# Patient Record
Sex: FEMALE | Race: WHITE | NOT HISPANIC OR LATINO | Employment: OTHER | URBAN - METROPOLITAN AREA
[De-identification: names, ages, dates, MRNs, and addresses within clinical notes are randomized per-mention and may not be internally consistent; named-entity substitution may affect disease eponyms.]

---

## 2017-01-23 ENCOUNTER — HISTORICAL (OUTPATIENT)
Dept: SPEECH THERAPY | Facility: HOSPITAL | Age: 82
End: 2017-01-23

## 2017-02-09 ENCOUNTER — HISTORICAL (OUTPATIENT)
Dept: SPEECH THERAPY | Facility: HOSPITAL | Age: 82
End: 2017-02-09

## 2017-02-21 ENCOUNTER — HISTORICAL (OUTPATIENT)
Dept: SPEECH THERAPY | Facility: HOSPITAL | Age: 82
End: 2017-02-21

## 2017-03-15 ENCOUNTER — HISTORICAL (OUTPATIENT)
Dept: SPEECH THERAPY | Facility: HOSPITAL | Age: 82
End: 2017-03-15

## 2017-05-23 ENCOUNTER — HISTORICAL (OUTPATIENT)
Dept: LAB | Facility: HOSPITAL | Age: 82
End: 2017-05-23

## 2017-05-23 LAB
ABS NEUT (OLG): 3.46 X10(3)/MCL (ref 2.1–9.2)
ALBUMIN SERPL-MCNC: 3.6 GM/DL (ref 3.4–5)
ALBUMIN/GLOB SERPL: 1.1 {RATIO}
ALP SERPL-CCNC: 78 UNIT/L (ref 38–126)
ALT SERPL-CCNC: 22 UNIT/L (ref 12–78)
APTT PPP: 31.9 SECOND(S) (ref 20.6–36)
AST SERPL-CCNC: 10 UNIT/L (ref 15–37)
BASOPHILS # BLD AUTO: 0 X10(3)/MCL (ref 0–0.2)
BASOPHILS NFR BLD AUTO: 1 %
BILIRUB SERPL-MCNC: 0.3 MG/DL (ref 0.2–1)
BILIRUBIN DIRECT+TOT PNL SERPL-MCNC: 0.1 MG/DL (ref 0–0.2)
BILIRUBIN DIRECT+TOT PNL SERPL-MCNC: 0.2 MG/DL (ref 0–0.8)
BUN SERPL-MCNC: 21 MG/DL (ref 7–18)
CALCIUM SERPL-MCNC: 8.8 MG/DL (ref 8.5–10.1)
CHLORIDE SERPL-SCNC: 102 MMOL/L (ref 98–107)
CO2 SERPL-SCNC: 29 MMOL/L (ref 21–32)
CREAT SERPL-MCNC: 0.86 MG/DL (ref 0.55–1.02)
EOSINOPHIL # BLD AUTO: 0.1 X10(3)/MCL (ref 0–0.9)
EOSINOPHIL NFR BLD AUTO: 2 %
ERYTHROCYTE [DISTWIDTH] IN BLOOD BY AUTOMATED COUNT: 14.6 % (ref 11.5–17)
GLOBULIN SER-MCNC: 3.3 GM/DL (ref 2.4–3.5)
GLUCOSE SERPL-MCNC: 135 MG/DL (ref 74–106)
HCT VFR BLD AUTO: 39.2 % (ref 37–47)
HGB BLD-MCNC: 12.6 GM/DL (ref 12–16)
INR PPP: 1.13 (ref 0–1.27)
LYMPHOCYTES # BLD AUTO: 1.4 X10(3)/MCL (ref 0.6–4.6)
LYMPHOCYTES NFR BLD AUTO: 26 %
MCH RBC QN AUTO: 29.2 PG (ref 27–31)
MCHC RBC AUTO-ENTMCNC: 32.1 GM/DL (ref 33–36)
MCV RBC AUTO: 91 FL (ref 80–94)
MONOCYTES # BLD AUTO: 0.4 X10(3)/MCL (ref 0.1–1.3)
MONOCYTES NFR BLD AUTO: 7 %
NEUTROPHILS # BLD AUTO: 3.46 X10(3)/MCL (ref 1.4–7.9)
NEUTROPHILS NFR BLD AUTO: 63 %
PLATELET # BLD AUTO: 227 X10(3)/MCL (ref 130–400)
PMV BLD AUTO: 9.6 FL (ref 9.4–12.4)
POTASSIUM SERPL-SCNC: 4.6 MMOL/L (ref 3.5–5.1)
PROT SERPL-MCNC: 6.9 GM/DL (ref 6.4–8.2)
PROTHROMBIN TIME: 14.3 SECOND(S) (ref 12.1–14.2)
RBC # BLD AUTO: 4.31 X10(6)/MCL (ref 4.2–5.4)
SODIUM SERPL-SCNC: 139 MMOL/L (ref 136–145)
WBC # SPEC AUTO: 5.5 X10(3)/MCL (ref 4.5–11.5)

## 2017-05-26 ENCOUNTER — HISTORICAL (OUTPATIENT)
Dept: ADMINISTRATIVE | Facility: HOSPITAL | Age: 82
End: 2017-05-26

## 2017-06-01 ENCOUNTER — HISTORICAL (OUTPATIENT)
Dept: SPEECH THERAPY | Facility: HOSPITAL | Age: 82
End: 2017-06-01

## 2017-06-21 ENCOUNTER — HISTORICAL (OUTPATIENT)
Dept: SPEECH THERAPY | Facility: HOSPITAL | Age: 82
End: 2017-06-21

## 2017-07-24 ENCOUNTER — HISTORICAL (OUTPATIENT)
Dept: SURGERY | Facility: HOSPITAL | Age: 82
End: 2017-07-24

## 2017-07-24 LAB
ABS NEUT (OLG): 3.8 X10(3)/MCL (ref 1.5–6.9)
ALBUMIN SERPL-MCNC: 3.7 GM/DL (ref 3.4–5)
APTT PPP: 26.3 SECOND(S) (ref 25–35)
BASOPHILS # BLD AUTO: 0 X10(3)/MCL (ref 0–0.1)
BASOPHILS NFR BLD AUTO: 1 % (ref 0–1)
BUN SERPL-MCNC: 16 MG/DL (ref 10–20)
CALCIUM SERPL-MCNC: 8.8 MG/DL (ref 8–10.5)
CHLORIDE SERPL-SCNC: 103 MMOL/L (ref 100–108)
CO2 SERPL-SCNC: 30 MMOL/L (ref 21–35)
CREAT SERPL-MCNC: 0.87 MG/DL (ref 0.7–1.3)
EOSINOPHIL # BLD AUTO: 0.1 X10(3)/MCL (ref 0–0.6)
EOSINOPHIL NFR BLD AUTO: 2 % (ref 0–5)
ERYTHROCYTE [DISTWIDTH] IN BLOOD BY AUTOMATED COUNT: 14.1 % (ref 11.5–17)
GLUCOSE SERPL-MCNC: 99 MG/DL (ref 75–116)
HCT VFR BLD AUTO: 37.3 % (ref 36–48)
HGB BLD-MCNC: 12.4 GM/DL (ref 12–16)
INR PPP: 1.1 (ref 0–1.2)
LYMPHOCYTES # BLD AUTO: 1.1 X10(3)/MCL (ref 0.5–4.1)
LYMPHOCYTES NFR BLD AUTO: 20.6 % (ref 15–40)
MCH RBC QN AUTO: 30 PG (ref 27–34)
MCHC RBC AUTO-ENTMCNC: 33 GM/DL (ref 31–36)
MCV RBC AUTO: 91 FL (ref 80–99)
MONOCYTES # BLD AUTO: 0.4 X10(3)/MCL (ref 0–1.1)
MONOCYTES NFR BLD AUTO: 7 % (ref 4–12)
NEUTROPHILS # BLD AUTO: 3.8 X10(3)/MCL (ref 1.5–6.9)
NEUTROPHILS NFR BLD AUTO: 70 % (ref 43–75)
PHOSPHATE SERPL-MCNC: 4.6 MG/DL (ref 2.6–4.7)
PLATELET # BLD AUTO: 237 X10(3)/MCL (ref 140–400)
PMV BLD AUTO: 9.5 FL (ref 6.8–10)
POTASSIUM SERPL-SCNC: 4.3 MMOL/L (ref 3.6–5.2)
PROTHROMBIN TIME: 11.5 SECOND(S) (ref 9–12)
RBC # BLD AUTO: 4.1 X10(6)/MCL (ref 4.2–5.4)
SODIUM SERPL-SCNC: 139 MMOL/L (ref 135–145)
WBC # SPEC AUTO: 5.4 X10(3)/MCL (ref 4.5–11.5)

## 2017-07-25 ENCOUNTER — HISTORICAL (OUTPATIENT)
Dept: ADMINISTRATIVE | Facility: HOSPITAL | Age: 82
End: 2017-07-25

## 2017-07-25 LAB
ABS NEUT (OLG): 4.92 X10(3)/MCL (ref 2.1–9.2)
ALBUMIN SERPL-MCNC: 3.5 GM/DL (ref 3.4–5)
ALBUMIN/GLOB SERPL: 1.1 RATIO (ref 1.1–2)
ALP SERPL-CCNC: 64 UNIT/L (ref 38–126)
ALT SERPL-CCNC: 23 UNIT/L (ref 12–78)
AST SERPL-CCNC: 12 UNIT/L (ref 15–37)
BASOPHILS # BLD AUTO: 0 X10(3)/MCL (ref 0–0.2)
BASOPHILS NFR BLD AUTO: 0.6 %
BILIRUB SERPL-MCNC: 0.4 MG/DL (ref 0.2–1)
BILIRUBIN DIRECT+TOT PNL SERPL-MCNC: 0.1 MG/DL (ref 0–0.5)
BILIRUBIN DIRECT+TOT PNL SERPL-MCNC: 0.3 MG/DL (ref 0–0.8)
BUN SERPL-MCNC: 17 MG/DL (ref 7–18)
CALCIUM SERPL-MCNC: 8.5 MG/DL (ref 8.5–10.1)
CHLORIDE SERPL-SCNC: 104 MMOL/L (ref 98–107)
CO2 SERPL-SCNC: 27 MMOL/L (ref 21–32)
CREAT SERPL-MCNC: 0.94 MG/DL (ref 0.55–1.02)
EOSINOPHIL # BLD AUTO: 0.1 X10(3)/MCL (ref 0–0.9)
EOSINOPHIL NFR BLD AUTO: 1.6 %
ERYTHROCYTE [DISTWIDTH] IN BLOOD BY AUTOMATED COUNT: 14.1 % (ref 11.5–17)
FERRITIN SERPL-MCNC: 89.9 NG/ML (ref 8–388)
GLOBULIN SER-MCNC: 3.3 GM/DL (ref 2.4–3.5)
GLUCOSE SERPL-MCNC: 91 MG/DL (ref 74–106)
HCT VFR BLD AUTO: 36.4 % (ref 37–47)
HGB BLD-MCNC: 11.8 GM/DL (ref 12–16)
IRON SATN MFR SERPL: 24.7 % (ref 20–50)
IRON SERPL-MCNC: 80 MCG/DL (ref 50–175)
LYMPHOCYTES # BLD AUTO: 1.3 X10(3)/MCL (ref 0.6–4.6)
LYMPHOCYTES NFR BLD AUTO: 19.1 %
MCH RBC QN AUTO: 30.1 PG (ref 27–31)
MCHC RBC AUTO-ENTMCNC: 32.4 GM/DL (ref 33–36)
MCV RBC AUTO: 92.9 FL (ref 80–94)
MONOCYTES # BLD AUTO: 0.5 X10(3)/MCL (ref 0.1–1.3)
MONOCYTES NFR BLD AUTO: 7.1 %
NEUTROPHILS # BLD AUTO: 4.9 X10(3)/MCL (ref 2.1–9.2)
NEUTROPHILS NFR BLD AUTO: 71.6 %
PLATELET # BLD AUTO: 209 X10(3)/MCL (ref 130–400)
PMV BLD AUTO: 8.8 FL (ref 9.4–12.4)
POTASSIUM SERPL-SCNC: 4 MMOL/L (ref 3.5–5.1)
PROT SERPL-MCNC: 6.8 GM/DL (ref 6.4–8.2)
RBC # BLD AUTO: 3.92 X10(6)/MCL (ref 4.2–5.4)
SODIUM SERPL-SCNC: 141 MMOL/L (ref 136–145)
TIBC SERPL-MCNC: 324 MCG/DL (ref 250–450)
TRANSFERRIN SERPL-MCNC: 262 MG/DL (ref 200–360)
VIT B12 SERPL-MCNC: 546 PG/ML (ref 193–986)
WBC # SPEC AUTO: 6.9 X10(3)/MCL (ref 4.5–11.5)

## 2017-07-31 ENCOUNTER — HOSPITAL ENCOUNTER (OUTPATIENT)
Dept: MEDSURG UNIT | Facility: HOSPITAL | Age: 82
End: 2017-08-01

## 2017-08-10 ENCOUNTER — HISTORICAL (OUTPATIENT)
Dept: ANESTHESIOLOGY | Facility: HOSPITAL | Age: 82
End: 2017-08-10

## 2017-08-10 LAB
ABS NEUT (OLG): 3.7 X10(3)/MCL (ref 1.5–6.9)
BASOPHILS # BLD AUTO: 0 X10(3)/MCL (ref 0–0.1)
BASOPHILS NFR BLD AUTO: 1 % (ref 0–1)
BUN SERPL-MCNC: 19 MG/DL (ref 10–20)
CALCIUM SERPL-MCNC: 8.8 MG/DL (ref 8–10.5)
CHLORIDE SERPL-SCNC: 105 MMOL/L (ref 100–108)
CO2 SERPL-SCNC: 30 MMOL/L (ref 21–35)
CREAT SERPL-MCNC: 0.8 MG/DL (ref 0.7–1.3)
EOSINOPHIL # BLD AUTO: 0.1 X10(3)/MCL (ref 0–0.6)
EOSINOPHIL NFR BLD AUTO: 2 % (ref 0–5)
ERYTHROCYTE [DISTWIDTH] IN BLOOD BY AUTOMATED COUNT: 14.1 % (ref 11.5–17)
GLUCOSE SERPL-MCNC: 126 MG/DL (ref 75–116)
HCT VFR BLD AUTO: 37.3 % (ref 36–48)
HGB BLD-MCNC: 12.3 GM/DL (ref 12–16)
LYMPHOCYTES # BLD AUTO: 1.6 X10(3)/MCL (ref 0.5–4.1)
LYMPHOCYTES NFR BLD AUTO: 25.7 % (ref 15–40)
MCH RBC QN AUTO: 30 PG (ref 27–34)
MCHC RBC AUTO-ENTMCNC: 33 GM/DL (ref 31–36)
MCV RBC AUTO: 91 FL (ref 80–99)
MONOCYTES # BLD AUTO: 0.6 X10(3)/MCL (ref 0–1.1)
MONOCYTES NFR BLD AUTO: 9 % (ref 4–12)
NEUTROPHILS # BLD AUTO: 3.7 X10(3)/MCL (ref 1.5–6.9)
NEUTROPHILS NFR BLD AUTO: 62 % (ref 43–75)
PLATELET # BLD AUTO: 261 X10(3)/MCL (ref 140–400)
PMV BLD AUTO: 9.7 FL (ref 6.8–10)
POTASSIUM SERPL-SCNC: 4.2 MMOL/L (ref 3.6–5.2)
RBC # BLD AUTO: 4.11 X10(6)/MCL (ref 4.2–5.4)
SODIUM SERPL-SCNC: 142 MMOL/L (ref 135–145)
WBC # SPEC AUTO: 6.1 X10(3)/MCL (ref 4.5–11.5)

## 2017-09-19 ENCOUNTER — HISTORICAL (OUTPATIENT)
Dept: SPEECH THERAPY | Facility: HOSPITAL | Age: 82
End: 2017-09-19

## 2017-09-21 ENCOUNTER — HISTORICAL (OUTPATIENT)
Dept: SPEECH THERAPY | Facility: HOSPITAL | Age: 82
End: 2017-09-21

## 2018-01-29 ENCOUNTER — HISTORICAL (OUTPATIENT)
Dept: SPEECH THERAPY | Facility: HOSPITAL | Age: 83
End: 2018-01-29

## 2018-04-12 ENCOUNTER — HISTORICAL (OUTPATIENT)
Dept: ADMINISTRATIVE | Facility: HOSPITAL | Age: 83
End: 2018-04-12

## 2018-04-23 ENCOUNTER — HISTORICAL (OUTPATIENT)
Dept: SPEECH THERAPY | Facility: HOSPITAL | Age: 83
End: 2018-04-23

## 2018-07-24 ENCOUNTER — HISTORICAL (OUTPATIENT)
Dept: ADMINISTRATIVE | Facility: HOSPITAL | Age: 83
End: 2018-07-24

## 2018-07-24 LAB
ABS NEUT (OLG): 7.99 X10(3)/MCL (ref 2.1–9.2)
ALBUMIN SERPL-MCNC: 3.6 GM/DL (ref 3.4–5)
ALBUMIN/GLOB SERPL: 1 RATIO (ref 1.1–2)
ALP SERPL-CCNC: 73 UNIT/L (ref 38–126)
ALT SERPL-CCNC: 20 UNIT/L (ref 12–78)
AST SERPL-CCNC: 11 UNIT/L (ref 15–37)
BASOPHILS # BLD AUTO: 0 X10(3)/MCL (ref 0–0.2)
BASOPHILS NFR BLD AUTO: 0.3 %
BILIRUB SERPL-MCNC: 0.4 MG/DL (ref 0.2–1)
BILIRUBIN DIRECT+TOT PNL SERPL-MCNC: 0.1 MG/DL (ref 0–0.5)
BILIRUBIN DIRECT+TOT PNL SERPL-MCNC: 0.3 MG/DL (ref 0–0.8)
BUN SERPL-MCNC: 17 MG/DL (ref 7–18)
CALCIUM SERPL-MCNC: 8.9 MG/DL (ref 8.5–10.1)
CHLORIDE SERPL-SCNC: 103 MMOL/L (ref 98–107)
CO2 SERPL-SCNC: 31 MMOL/L (ref 21–32)
CREAT SERPL-MCNC: 0.8 MG/DL (ref 0.55–1.02)
EOSINOPHIL # BLD AUTO: 0.1 X10(3)/MCL (ref 0–0.9)
EOSINOPHIL NFR BLD AUTO: 1.1 %
ERYTHROCYTE [DISTWIDTH] IN BLOOD BY AUTOMATED COUNT: 13.8 % (ref 11.5–17)
GLOBULIN SER-MCNC: 3.7 GM/DL (ref 2.4–3.5)
GLUCOSE SERPL-MCNC: 112 MG/DL (ref 74–106)
HCT VFR BLD AUTO: 38.5 % (ref 37–47)
HGB BLD-MCNC: 12.4 GM/DL (ref 12–16)
LYMPHOCYTES # BLD AUTO: 1.2 X10(3)/MCL (ref 0.6–4.6)
LYMPHOCYTES NFR BLD AUTO: 11.6 %
MCH RBC QN AUTO: 30 PG (ref 27–31)
MCHC RBC AUTO-ENTMCNC: 32.2 GM/DL (ref 33–36)
MCV RBC AUTO: 93 FL (ref 80–94)
MONOCYTES # BLD AUTO: 0.6 X10(3)/MCL (ref 0.1–1.3)
MONOCYTES NFR BLD AUTO: 6.5 %
NEUTROPHILS # BLD AUTO: 8 X10(3)/MCL (ref 2.1–9.2)
NEUTROPHILS NFR BLD AUTO: 80.5 %
PLATELET # BLD AUTO: 266 X10(3)/MCL (ref 130–400)
PMV BLD AUTO: 9.1 FL (ref 9.4–12.4)
POTASSIUM SERPL-SCNC: 4.9 MMOL/L (ref 3.5–5.1)
PROT SERPL-MCNC: 7.3 GM/DL (ref 6.4–8.2)
RBC # BLD AUTO: 4.14 X10(6)/MCL (ref 4.2–5.4)
SODIUM SERPL-SCNC: 140 MMOL/L (ref 136–145)
WBC # SPEC AUTO: 9.9 X10(3)/MCL (ref 4.5–11.5)

## 2018-08-01 ENCOUNTER — HISTORICAL (OUTPATIENT)
Dept: SPEECH THERAPY | Facility: HOSPITAL | Age: 83
End: 2018-08-01

## 2018-11-28 ENCOUNTER — HISTORICAL (OUTPATIENT)
Dept: SPEECH THERAPY | Facility: HOSPITAL | Age: 83
End: 2018-11-28

## 2019-05-14 ENCOUNTER — HISTORICAL (OUTPATIENT)
Dept: SPEECH THERAPY | Facility: HOSPITAL | Age: 84
End: 2019-05-14

## 2019-07-23 ENCOUNTER — HISTORICAL (OUTPATIENT)
Dept: ADMINISTRATIVE | Facility: HOSPITAL | Age: 84
End: 2019-07-23

## 2019-07-23 LAB
ABS NEUT (OLG): 4.17 X10(3)/MCL (ref 2.1–9.2)
ALBUMIN SERPL-MCNC: 3.6 GM/DL (ref 3.4–5)
ALBUMIN/GLOB SERPL: 1 {RATIO}
ALP SERPL-CCNC: 80 UNIT/L (ref 38–126)
ALT SERPL-CCNC: 21 UNIT/L (ref 12–78)
AST SERPL-CCNC: 9 UNIT/L (ref 15–37)
BASOPHILS # BLD AUTO: 0 X10(3)/MCL (ref 0–0.2)
BASOPHILS NFR BLD AUTO: 0.7 %
BILIRUB SERPL-MCNC: 0.5 MG/DL (ref 0.2–1)
BILIRUBIN DIRECT+TOT PNL SERPL-MCNC: 0.1 MG/DL (ref 0–0.2)
BILIRUBIN DIRECT+TOT PNL SERPL-MCNC: 0.4 MG/DL (ref 0–0.8)
BUN SERPL-MCNC: 17 MG/DL (ref 7–18)
CALCIUM SERPL-MCNC: 8.6 MG/DL (ref 8.5–10.1)
CHLORIDE SERPL-SCNC: 105 MMOL/L (ref 98–107)
CO2 SERPL-SCNC: 29 MMOL/L (ref 21–32)
CREAT SERPL-MCNC: 0.83 MG/DL (ref 0.55–1.02)
EOSINOPHIL # BLD AUTO: 0.1 X10(3)/MCL (ref 0–0.9)
EOSINOPHIL NFR BLD AUTO: 2 %
ERYTHROCYTE [DISTWIDTH] IN BLOOD BY AUTOMATED COUNT: 13.2 % (ref 11.5–17)
GLOBULIN SER-MCNC: 3.6 GM/DL (ref 2.4–3.5)
GLUCOSE SERPL-MCNC: 113 MG/DL (ref 74–106)
HCT VFR BLD AUTO: 41 % (ref 37–47)
HGB BLD-MCNC: 12.9 GM/DL (ref 12–16)
LYMPHOCYTES # BLD AUTO: 1.2 X10(3)/MCL (ref 0.6–4.6)
LYMPHOCYTES NFR BLD AUTO: 20.8 %
MCH RBC QN AUTO: 29.1 PG (ref 27–31)
MCHC RBC AUTO-ENTMCNC: 31.5 GM/DL (ref 33–36)
MCV RBC AUTO: 92.6 FL (ref 80–94)
MONOCYTES # BLD AUTO: 0.4 X10(3)/MCL (ref 0.1–1.3)
MONOCYTES NFR BLD AUTO: 6.8 %
NEUTROPHILS # BLD AUTO: 4.2 X10(3)/MCL (ref 2.1–9.2)
NEUTROPHILS NFR BLD AUTO: 69.5 %
PLATELET # BLD AUTO: 240 X10(3)/MCL (ref 130–400)
PMV BLD AUTO: 9 FL (ref 9.4–12.4)
POTASSIUM SERPL-SCNC: 4.3 MMOL/L (ref 3.5–5.1)
PROT SERPL-MCNC: 7.2 GM/DL (ref 6.4–8.2)
RBC # BLD AUTO: 4.43 X10(6)/MCL (ref 4.2–5.4)
SODIUM SERPL-SCNC: 141 MMOL/L (ref 136–145)
WBC # SPEC AUTO: 6 X10(3)/MCL (ref 4.5–11.5)

## 2019-08-16 ENCOUNTER — HISTORICAL (OUTPATIENT)
Dept: SPEECH THERAPY | Facility: HOSPITAL | Age: 84
End: 2019-08-16

## 2019-12-11 ENCOUNTER — HISTORICAL (OUTPATIENT)
Dept: SPEECH THERAPY | Facility: HOSPITAL | Age: 84
End: 2019-12-11

## 2020-06-11 ENCOUNTER — HISTORICAL (OUTPATIENT)
Dept: SPEECH THERAPY | Facility: HOSPITAL | Age: 85
End: 2020-06-11

## 2020-07-23 ENCOUNTER — HISTORICAL (OUTPATIENT)
Dept: HEMATOLOGY/ONCOLOGY | Facility: CLINIC | Age: 85
End: 2020-07-23

## 2020-07-23 LAB
ABS NEUT (OLG): 3.89 X10(3)/MCL (ref 2.1–9.2)
ALBUMIN SERPL-MCNC: 3.6 GM/DL (ref 3.4–5)
ALBUMIN/GLOB SERPL: 1.2 RATIO (ref 1.1–2)
ALP SERPL-CCNC: 63 UNIT/L (ref 40–150)
ALT SERPL-CCNC: 11 UNIT/L (ref 0–55)
AST SERPL-CCNC: 12 UNIT/L (ref 5–34)
BASOPHILS # BLD AUTO: 0 X10(3)/MCL (ref 0–0.2)
BASOPHILS NFR BLD AUTO: 0.9 %
BILIRUB SERPL-MCNC: 0.5 MG/DL
BILIRUBIN DIRECT+TOT PNL SERPL-MCNC: 0.2 MG/DL (ref 0–0.5)
BILIRUBIN DIRECT+TOT PNL SERPL-MCNC: 0.3 MG/DL (ref 0–0.8)
BUN SERPL-MCNC: 16.4 MG/DL (ref 9.8–20.1)
CALCIUM SERPL-MCNC: 8.8 MG/DL (ref 8.4–10.2)
CHLORIDE SERPL-SCNC: 101 MMOL/L (ref 98–107)
CO2 SERPL-SCNC: 31 MMOL/L (ref 23–31)
CREAT SERPL-MCNC: 0.85 MG/DL (ref 0.55–1.02)
EOSINOPHIL # BLD AUTO: 0.1 X10(3)/MCL (ref 0–0.9)
EOSINOPHIL NFR BLD AUTO: 1.6 %
ERYTHROCYTE [DISTWIDTH] IN BLOOD BY AUTOMATED COUNT: 13.4 % (ref 11.5–17)
GLOBULIN SER-MCNC: 3 GM/DL (ref 2.4–3.5)
GLUCOSE SERPL-MCNC: 107 MG/DL (ref 82–115)
HCT VFR BLD AUTO: 39.1 % (ref 37–47)
HGB BLD-MCNC: 12.2 GM/DL (ref 12–16)
LYMPHOCYTES # BLD AUTO: 1.1 X10(3)/MCL (ref 0.6–4.6)
LYMPHOCYTES NFR BLD AUTO: 19.9 %
MCH RBC QN AUTO: 28.6 PG (ref 27–31)
MCHC RBC AUTO-ENTMCNC: 31.2 GM/DL (ref 33–36)
MCV RBC AUTO: 91.8 FL (ref 80–94)
MONOCYTES # BLD AUTO: 0.4 X10(3)/MCL (ref 0.1–1.3)
MONOCYTES NFR BLD AUTO: 7.1 %
NEUTROPHILS # BLD AUTO: 3.9 X10(3)/MCL (ref 2.1–9.2)
NEUTROPHILS NFR BLD AUTO: 70.3 %
PLATELET # BLD AUTO: 207 X10(3)/MCL (ref 130–400)
PMV BLD AUTO: 8.5 FL (ref 9.4–12.4)
POTASSIUM SERPL-SCNC: 4.1 MMOL/L (ref 3.5–5.1)
PROT SERPL-MCNC: 6.6 GM/DL (ref 5.8–7.6)
RBC # BLD AUTO: 4.26 X10(6)/MCL (ref 4.2–5.4)
SODIUM SERPL-SCNC: 138 MMOL/L (ref 136–145)
WBC # SPEC AUTO: 5.5 X10(3)/MCL (ref 4.5–11.5)

## 2020-09-01 ENCOUNTER — HISTORICAL (OUTPATIENT)
Dept: ADMINISTRATIVE | Facility: HOSPITAL | Age: 85
End: 2020-09-01

## 2020-09-01 LAB
FERRITIN SERPL-MCNC: 42.72 NG/ML (ref 4.63–204)
VIT B12 SERPL-MCNC: 523 PG/ML (ref 213–816)

## 2020-09-04 ENCOUNTER — HISTORICAL (OUTPATIENT)
Dept: SPEECH THERAPY | Facility: HOSPITAL | Age: 85
End: 2020-09-04

## 2020-10-21 ENCOUNTER — HISTORICAL (OUTPATIENT)
Dept: SPEECH THERAPY | Facility: HOSPITAL | Age: 85
End: 2020-10-21

## 2020-11-18 ENCOUNTER — HISTORICAL (OUTPATIENT)
Dept: ADMINISTRATIVE | Facility: HOSPITAL | Age: 85
End: 2020-11-18

## 2020-11-18 LAB
ABS NEUT (OLG): 3.31 X10(3)/MCL (ref 2.1–9.2)
ALBUMIN SERPL-MCNC: 3.8 GM/DL (ref 3.4–4.8)
ALBUMIN/GLOB SERPL: 1.3 RATIO (ref 1.1–2)
ALP SERPL-CCNC: 69 UNIT/L (ref 40–150)
ALT SERPL-CCNC: 14 UNIT/L (ref 0–55)
AST SERPL-CCNC: 15 UNIT/L (ref 5–34)
BASOPHILS # BLD AUTO: 0 X10(3)/MCL (ref 0–0.2)
BASOPHILS NFR BLD AUTO: 0.6 %
BILIRUB SERPL-MCNC: 0.4 MG/DL
BILIRUBIN DIRECT+TOT PNL SERPL-MCNC: 0.2 MG/DL (ref 0–0.5)
BILIRUBIN DIRECT+TOT PNL SERPL-MCNC: 0.2 MG/DL (ref 0–0.8)
BUN SERPL-MCNC: 17.6 MG/DL (ref 9.8–20.1)
CALCIUM SERPL-MCNC: 8.6 MG/DL (ref 8.4–10.2)
CHLORIDE SERPL-SCNC: 104 MMOL/L (ref 98–107)
CO2 SERPL-SCNC: 29 MMOL/L (ref 23–31)
CREAT SERPL-MCNC: 0.74 MG/DL (ref 0.55–1.02)
EOSINOPHIL # BLD AUTO: 0.1 X10(3)/MCL (ref 0–0.9)
EOSINOPHIL NFR BLD AUTO: 1.4 %
ERYTHROCYTE [DISTWIDTH] IN BLOOD BY AUTOMATED COUNT: 13.8 % (ref 11.5–17)
GLOBULIN SER-MCNC: 2.9 GM/DL (ref 2.4–3.5)
GLUCOSE SERPL-MCNC: 60 MG/DL (ref 82–115)
HCT VFR BLD AUTO: 36.7 % (ref 37–47)
HGB BLD-MCNC: 11.8 GM/DL (ref 12–16)
LYMPHOCYTES # BLD AUTO: 1.1 X10(3)/MCL (ref 0.6–4.6)
LYMPHOCYTES NFR BLD AUTO: 22.2 %
MCH RBC QN AUTO: 28.7 PG (ref 27–31)
MCHC RBC AUTO-ENTMCNC: 32.2 GM/DL (ref 33–36)
MCV RBC AUTO: 89.3 FL (ref 80–94)
MONOCYTES # BLD AUTO: 0.4 X10(3)/MCL (ref 0.1–1.3)
MONOCYTES NFR BLD AUTO: 8.7 %
NEUTROPHILS # BLD AUTO: 3.3 X10(3)/MCL (ref 2.1–9.2)
NEUTROPHILS NFR BLD AUTO: 66.9 %
PLATELET # BLD AUTO: 222 X10(3)/MCL (ref 130–400)
PMV BLD AUTO: 8.6 FL (ref 9.4–12.4)
POTASSIUM SERPL-SCNC: 4.1 MMOL/L (ref 3.5–5.1)
PROT SERPL-MCNC: 6.7 GM/DL (ref 5.8–7.6)
RBC # BLD AUTO: 4.11 X10(6)/MCL (ref 4.2–5.4)
SODIUM SERPL-SCNC: 140 MMOL/L (ref 136–145)
WBC # SPEC AUTO: 5 X10(3)/MCL (ref 4.5–11.5)

## 2021-01-08 ENCOUNTER — HISTORICAL (OUTPATIENT)
Dept: SPEECH THERAPY | Facility: HOSPITAL | Age: 86
End: 2021-01-08

## 2021-04-12 ENCOUNTER — HISTORICAL (OUTPATIENT)
Dept: SPEECH THERAPY | Facility: HOSPITAL | Age: 86
End: 2021-04-12

## 2021-09-27 ENCOUNTER — HISTORICAL (OUTPATIENT)
Dept: SPEECH THERAPY | Facility: HOSPITAL | Age: 86
End: 2021-09-27

## 2021-11-22 ENCOUNTER — HISTORICAL (OUTPATIENT)
Dept: SPEECH THERAPY | Facility: HOSPITAL | Age: 86
End: 2021-11-22

## 2021-12-13 ENCOUNTER — HISTORICAL (OUTPATIENT)
Dept: HEMATOLOGY/ONCOLOGY | Facility: CLINIC | Age: 86
End: 2021-12-13

## 2021-12-13 LAB
ABS NEUT (OLG): 4.83 X10(3)/MCL (ref 2.1–9.2)
ALBUMIN SERPL-MCNC: 3.9 GM/DL (ref 3.4–4.8)
ALBUMIN/GLOB SERPL: 1.3 RATIO (ref 1.1–2)
ALP SERPL-CCNC: 67 UNIT/L (ref 40–150)
ALT SERPL-CCNC: 19 UNIT/L (ref 0–55)
AST SERPL-CCNC: 19 UNIT/L (ref 5–34)
BASOPHILS # BLD AUTO: 0 X10(3)/MCL (ref 0–0.2)
BASOPHILS NFR BLD AUTO: 0.7 %
BILIRUB SERPL-MCNC: 0.4 MG/DL
BILIRUBIN DIRECT+TOT PNL SERPL-MCNC: 0.2 MG/DL (ref 0–0.5)
BILIRUBIN DIRECT+TOT PNL SERPL-MCNC: 0.2 MG/DL (ref 0–0.8)
BUN SERPL-MCNC: 22.3 MG/DL (ref 9.8–20.1)
CALCIUM SERPL-MCNC: 8.9 MG/DL (ref 8.7–10.5)
CHLORIDE SERPL-SCNC: 106 MMOL/L (ref 98–107)
CO2 SERPL-SCNC: 30 MMOL/L (ref 23–31)
CREAT SERPL-MCNC: 0.81 MG/DL (ref 0.55–1.02)
EOSINOPHIL # BLD AUTO: 0.1 X10(3)/MCL (ref 0–0.9)
EOSINOPHIL NFR BLD AUTO: 1.5 %
ERYTHROCYTE [DISTWIDTH] IN BLOOD BY AUTOMATED COUNT: 14.4 % (ref 11.5–17)
GLOBULIN SER-MCNC: 3.1 GM/DL (ref 2.4–3.5)
GLUCOSE SERPL-MCNC: 80 MG/DL (ref 82–115)
HCT VFR BLD AUTO: 37.3 % (ref 37–47)
HGB BLD-MCNC: 12 GM/DL (ref 12–16)
LYMPHOCYTES # BLD AUTO: 1.2 X10(3)/MCL (ref 0.6–4.6)
LYMPHOCYTES NFR BLD AUTO: 18.3 %
MCH RBC QN AUTO: 28.8 PG (ref 27–31)
MCHC RBC AUTO-ENTMCNC: 32.2 GM/DL (ref 33–36)
MCV RBC AUTO: 89.7 FL (ref 80–94)
MONOCYTES # BLD AUTO: 0.6 X10(3)/MCL (ref 0.1–1.3)
MONOCYTES NFR BLD AUTO: 8.6 %
NEUTROPHILS # BLD AUTO: 4.8 X10(3)/MCL (ref 2.1–9.2)
NEUTROPHILS NFR BLD AUTO: 70.8 %
PLATELET # BLD AUTO: 231 X10(3)/MCL (ref 130–400)
PMV BLD AUTO: 9 FL (ref 9.4–12.4)
POTASSIUM SERPL-SCNC: 4.7 MMOL/L (ref 3.5–5.1)
PROT SERPL-MCNC: 7 GM/DL (ref 5.8–7.6)
RBC # BLD AUTO: 4.16 X10(6)/MCL (ref 4.2–5.4)
SODIUM SERPL-SCNC: 144 MMOL/L (ref 136–145)
WBC # SPEC AUTO: 6.8 X10(3)/MCL (ref 4.5–11.5)

## 2022-01-19 ENCOUNTER — HISTORICAL (OUTPATIENT)
Dept: SPEECH THERAPY | Facility: HOSPITAL | Age: 87
End: 2022-01-19

## 2022-03-10 ENCOUNTER — HISTORICAL (OUTPATIENT)
Dept: SPEECH THERAPY | Facility: HOSPITAL | Age: 87
End: 2022-03-10

## 2022-04-10 ENCOUNTER — HISTORICAL (OUTPATIENT)
Dept: ADMINISTRATIVE | Facility: HOSPITAL | Age: 87
End: 2022-04-10
Payer: MEDICARE

## 2022-04-13 ENCOUNTER — HISTORICAL (OUTPATIENT)
Dept: SPEECH THERAPY | Facility: HOSPITAL | Age: 87
End: 2022-04-13
Payer: MEDICARE

## 2022-04-29 VITALS
WEIGHT: 121.06 LBS | DIASTOLIC BLOOD PRESSURE: 85 MMHG | HEIGHT: 68 IN | SYSTOLIC BLOOD PRESSURE: 120 MMHG | BODY MASS INDEX: 18.35 KG/M2

## 2022-04-30 NOTE — OP NOTE
PREOPERATIVE DIAGNOSIS:  Right eye dacryocystitis.    POSTOPERATIVE DIAGNOSIS:  Right eye dacryocystitis.    PROCEDURE:  Right eye dacryocystorhinostomy.    ANESTHESIA:  General.    DESCRIPTION OF PROCEDURE:  After premedication, the patient was taken to the operating room, placed in supine position, prepped and draped in a sterile manner.  The nostril was packed with cocaine soaked pledgets.  Local anesthetic was applied for vasoconstriction.  A curvilinear incision was made approximately 3/4 of an inch at the medial canthal area and carried down to the periosteum which was incised with a [place] elevator and mobilized.  A rhinotomy was initiated at the bottom of the fossa and enlarged.  The nasal mucosa was identified and anterior flap created.  The posterior flap was cauterized.  The lacrimal sac was then opened with an 11 blade and a posterior flap was cauterized.  Silicon tubes were placed through the rhinotomy to exit the nostril and were tied with 2-0 silk sutures.  The anterior flaps of the rhinotomy were closed with an interrupted Vicryl and the nostril was packed with a lubricated nasal packing.  The incision was closed with a 6-9running nylon suture.  A rubber band drain was placed at the base.  The patient tolerated the procedure well, was extubated in the OR, taken to recovery in good condition.        THC/MASON   DD: 08/09/2017 0855   DT: 08/09/2017 0917  Job # [id number]

## 2022-04-30 NOTE — H&P
Patient:   Raymundo Arechgia            MRN: [id number]            FIN: [id number]               Age:   81 years     Sex:  Female     :  1935   Associated Diagnoses:   None   Author:   Sarah SANDOVAL, Morgan DUONG      Health Status   The H&P was reviewed, the patient was examined, and there are no changes to the patient's condition..

## 2022-04-30 NOTE — H&P
Patient:   Raymundo Arechiga            MRN: [id number]            FIN: [id number]               Age:   81 years     Sex:  Female     :  1935   Associated Diagnoses:   None   Author:   Morgan Smith MD      Patient: Raymundo Arechiga  YOB: 1935  Age: 81y  Chart: [id number]  Gender: Female  Visit Date: 2017  Referring Physician: Tarik Dexter III, Dr.    Chief Complaint: Dacryocystitis    Historian: patient who is a good historian.     Present Illness:   81-year-old white female who is status post a right dacryocystorhinostomy on 2017 for dacryocystitis of the right eye.  Yesterday evening the patient had accidentally pulled on the dacryocystorhinostomy tubing and it was hanging out of the patient's eye.  Patient was seen by Dr. Dexter earlier today but he could not retrieve the tubing intranasally using a nasal speculum.  He is being sent here for retrieval with use of nasal endoscopy.  Patient does not have any significant pain.  She does not have any purulent rhinorrhea.  No fever.    Past Medical History:  Diabetes mellitus.   Hypercholesterolemia.   Hypothyroidism.  Papillary thyroid carcinoma (stage IV) for which she had undergone total thyroidectomy with total laryngectomy at [facility] in , after having persistent cancer following postoperative radioactive iodine therapy after undergoing a right thyroid lobectomy and isthmusectomy in .  Left breast cancer (stage IA) which she underwent a left lumpectomy on 2011 with postoperative radiation therapy and treatment with Aromasin.  History of pneumothorax.  History of pulmonary nodule in the left lower lobe.    Past Surgical History:   Papillary thyroid carcinoma (stage IV) for which she had undergone total thyroidectomy with total laryngectomy at [facility] in , after having persistent cancer following postoperative radioactive iodine therapy after undergoing a right thyroid lobectomy  and isthmusectomy in 2015.  Left breast cancer (stage IA) which she underwent a left lumpectomy on 11/21/2011 with postoperative radiation therapy and treatment with Aromasin.  Tracheoesophageal puncture on 05/26/2017, with subsequent placement of a tracheoesophageal prosthesis for a laryngeal speech.  Right dacryocystorhinostomy on 07/31/2017.    Current Medications:    Active Medications   Synthroid 100 mcg. Take 1 tablet(s) By Mouth Daily. Do not substitute. (null).   Aromasin 25mg. Take 1 tablet(s) By Mouth Daily. Do not substitute. (null).   Lyrica 50 mg  . Take 1 tablet(s) By Mouth BID. Do not substitute. (null).   Tolterodine  . Take 1 capsule(s) By Mouth Daily. Do not substitute. (null).   Pilocarpine Hydrochloride 5 mg  . Take 1  5mg tablet(s) By Mouth TID. Do not substitute. (null).   allegra D  . Take 1 tablet(s) By Mouth Daily. Do not substitute. (null).   Nasonex nasal spray  .    Tobradex opthalmic suspension 0.1%/0.3%  .    Caltrate 600 With D  . Take 1 tablet(s) By Mouth BID. Do not substitute. (null).   centrum silver  . Take 1 tablet(s) By Mouth Daily. Do not substitute. (null).   Dexilant  . Take 1  60mg tablet(s) By Mouth Daily. Do not substitute. (null).   OTC Pepcid  .       Allergies:   No Active Allergies    Review of Systems:  Review of systems is unremarkable except as mentioned above.     Social History:  Patient has never used tobacco. Patient denies alcohol use. Patient denies the use of illicit drugs. Patient is retired. Patient is . Patient is a retired teacher.    Family History:  There is no family history of bleeding diathesis.   There is no family history of anesthetic complications.   There is a family history of heart disease.   There is a family history of hypertension.   There is a family history of diabetes.       Vitals:   Added vitals entry: Measurement date: 2017-08-09 14:07  Weight: 130.0 lbs  Temperature: 96.8° F  Heart rate: 69 bpm  Blood pressure:  129 / 77 mmHg      Physical Exam:  General: Well developed & well nourished female in no acute distress. Patient speaks using tracheoesophageal prosthesis.  Head & Face: Normocephalic without any apparent skin cancer, facial swelling, masses, or erythema.   Nose: The nose was examined endoscopically after spraying the nose with 1% phenylephrine and 4% lidocaine.  Nasal dorsum is unremarkable. No significant septal deviation. No septal perforation. Patients right nasal cavity was examined endoscopically with a 0° and 30° 2.7 mm sinus endoscope.  The surgical defect related to the dacryocystorhinostomy were noted in the lateral nasal wall just above the inferior turbinate.  There was some granulation in this area.  The dacryocystorhinostomy tubing cannot be visualized and cannot be extracted at this time.  Multiple attempts were made.  Eyes: Right eyedacryocystorhinostomy tubing is present in the canaliculus of the upper and lower eyelids and is hanging out of the eye out of its normal position.  Patient has a well-healed wound in the medial canthal region.  No sign of infection.  Neurologic: Alert & oriented. Cranial nerves 2- 12 are grossly normal.     Assessment:  Right dacryocystitis status post right dacryocystorhinostomy with displaced dacryocystorhinostomy tubing.    Plan:  Risks & alternatives to the proposed procedure were discussed as per consent. Questions were answered.  Patient will be admitted to the hospital on 08/09/2017, for nasal endoscopy and retrieval of dacryocystorhinostomy tubing under general anesthetic.

## 2022-04-30 NOTE — OP NOTE
DATE OF SURGERY:        SURGEON:  Patrick Hart Jr., MD    PREOPERATIVE DIAGNOSIS:  Aphonia, status post total laryngectomy.    POSTOPERATIVE DIAGNOSIS:  Aphonia, status post total laryngectomy.    INDICATION:  Raymundo Arechiga is a pleasant 81-year-old woman who has had a total laryngectomy elsewhere for invasive papillary thyroid carcinoma.  She has been ready for speech rehabilitation with a tracheoesophageal puncture.  After all risks and benefits were discussed, consent was signed and we proceeded today for placement of the TEP.    PROCEDURE:  Tracheoesophageal puncture.    ANESTHESIA:  General.    COMPLICATIONS:  None.    BLOOD LOSS:  Negligible.    PROCEDURE IN DETAIL:  The patient was brought to the operating room, she was identified by name and clinic number, she was transferred to the operating room table in supine position.  General anesthesia was induced and she was intubated via her tracheal stoma at the neck.  After she was asleep, the bed was then turned 90 degrees in preparation for the procedure.  The patient was prepped and draped in the usual fashion for surgery.       A mouth guard was fashioned to protect the upper teeth, a rigid esophagoscope was used to traverse the neopharynx down into the proximal esophagus just behind the party wall.  A rigid endoscope with a camera was also used for better visualization for where the puncture would take place.  Once the light source and distal tip of the rigid esophagoscope was palpated in the tracheal stoma.  The Algoregoox system was used for the TEP.  The curved needle cannula was placed about 10 millimeter from the tracheal stoma opening on the posterior wall of the trachea.  The curved needle went into the rigid esophagoscope in the esophagus and then the blue guide wire was treaded through up and out of the esophagoscope.  At this point, the esophagoscope was removed as well as the curved needle and the blue wire was the only thing remaining in situ.   At this point, the blunt guided cannula to deliver the TEP itself was threaded with the blue wire and this was then pulled down through the neopharnyx out of the trachea esophageal puncture until the tracheal phalange of the prosthesis unfurled through it.  The connecting wire was then cut and the tracheal puncture was in perfect position.  It was arranged vertically to seat well within the trachea and this concluded our procedure.  The mouth guard     was removed.  She tolerated it well.  She was turned back over the anesthesia team to wake up, she woke up with complication and returned to recovery room in stable condition.        ______________________________  MD SAM Dutton Jr./ALYSA  DD:  05/26/2017  Time:  08:37AM  DT:  05/26/2017  Time:  01:07PM  Job #:  [id number]

## 2022-04-30 NOTE — OP NOTE
Patient:   Raymundo Arechiga            MRN: [id number]            FIN: [id number]               Age:   81 years     Sex:  Female     :  1935   Associated Diagnoses:   None   Author:   Morgan Smith MD      Date of procedure: August 10, 2017    Preoperative diagnosis: Dacryocystitis status post right dacryocystorhinostomy with displaced dacryocystorhinostomy tube.    Postoperative diagnosis: Same    Procedure: Nasal endoscopy with retrieval of right dacryocystorhinostomy tube.    Surgeon: Morgan Smith M.D.    Findings: The patient's previously placed right dacryocystorhinostomy to was retracted up into the region of the dacryocystorhinostomy intranasally.  The end of the dacryocystorhinostomy tube was found in this area.    Specimens: None    Estimated blood loss: None    Complications: None    Drains: None    Anesthesia: Gen. endotracheal anesthesia    Indications: Please see history and physical exam    Procedure: Patient was brought to the operating room and placed on the operating room table in supine position after which general endotracheal anesthesia was induced.  Following induction of anesthesia the right nasal septum and right lateral nasal wall were infiltrated with 2% Xylocaine with 1 100,000 epinephrine for hemostasis.  Surgical davy saturated with 4% cocaine within also placed in the right nasal cavity.  The patient was then prepped and draped in sterile fashion.  Examination of the patient showed that the dacryocystorhinostomy tube which had been placed previously was out of position and hanging in the eye.  0° 4 mm sinus endoscope was used for the procedure.  The surgical patties were removed from the right nasal cavity.  The endoscope was inserted and advanced up to the area with dacryocystorhinostomy had been performed.  The ends of the dacryocystorhinostomy tube could then be seen in this area in and around the granulation tissue.  Alligator forceps were used to grasp both ends  of the dacryocystorhinostomy tube and it was pulled down into the nose to secure it back into its the 2 ends of the dacryocystorhinostomy tube were then secured to the right lateral nasal wall with a 4-0 nylon suture securing the tubes just anterior to the inferior turbinate.  At this point procedure was terminated.  The patient was awoken and brought to the recovery room in stable condition.  She tolerated the procedure well.    CC: Dr. Giuseppe Dexter

## 2022-05-04 NOTE — HISTORICAL [facility] CERNER
This is a historical note converted from [place]. Formatting and pictures may have been removed.  Please reference [place] for original formatting and attached multimedia. Chief Complaint  PATIENT HERE FOR RIGHT KNEE PAIN. PATIENT STATES THE PAIN HAS BEEN THERE FOR ABOUT A WEEK AND ON THE LATERAL SIDE OF THE KNEE  History of Present Illness  Patient stable for her right knee pain.  Had an injection which gave her?very good relief for several months even close to a years time.  States the pain is been giving?her increasing problems lately wishes to repeat the injection today  Review of Systems  Constitutional: No fever, No chills.  Respiratory: No shortness of breath, No cough.  Cardiovascular: No chest pain.  Gastrointestinal: No nausea, No vomiting, No diarrhea, No constipation, No heartburn.  Genitourinary: No dysuria, No hematuria.  Hematology/Lymphatics: No bleeding tendency.  Endocrine: No polyuria.  Neurologic: Alert and oriented X4, No numbness, No tingling.  Psychiatric: No anxiety, No depression.  Physical Exam  Vitals & Measurements  BP:?114/68?  HT:?170?cm? HT:?170?cm? WT:?58.9?kg? WT:?58.9?kg? BMI:?20.38?  Right knee exam  Tenderness to palpation of the lateral joint line  No effusion to the knee  Crepitance with ROM  ROM 5-125  Good pulses to the feet  No pain with ROM of the hips  ???  X-rays:?Reviewed from previous appointment  Degenerative changes of the knee with narrowing of the lateral compartment(s)?not bone-on-bone  Extra-articular osteophytes  Varus alignment  Assessment: Right knee osteoarthritis  ?   Plan: Right knee cortisone injection 2 cc Celestone 2 cc lidocaine  Using sterile technique after informed verbal consent. Risks discussed with patient prior to injection. Informed him the following instruction to follow:  If you are not already on anti-inflammatory do the following 3 to 4 tablets of Ibuprofen every 6 to 8 hours or 2 Aleve every 12 hours. If unable to take anti-inflammatory take 2  extra strength Tylenol every 8 hours for the next 5 days.  Apply ice to the knee 3 to 4 times a day for the next 5 days.  Make sure and move your knee this is the best way to get the medication where it should be, riding a bike or a stationary bike is also helpful.  Patient voiced understanding.  Assessment/Plan  1.?Osteoarthritis of right knee  Right knee pain   Problem List/Past Medical History  Ongoing  DVT - Deep vein thrombosis  Hyperlipidemia  Mitral valve prolapse  Osteoarthritis of right knee  Review of care plan  Historical  Breast cancer  Thyroid cancer  Procedure/Surgical History  Nasal Endoscopy (Right) (08/10/2017), Repair Right Lacrimal Duct, Via Natural or Artificial Opening Endoscopic (08/10/2017), Unlisted procedure, lacrimal system (08/10/2017), Bypass Right Lacrimal Duct to Nasal Cavity, Open Approach (07/31/2017), Dacryocystorhinostomy (fistulization of lacrimal sac to nasal cavity) (07/31/2017), Dacryocystorhinostomy (Right) (07/31/2017), Construction of tracheoesophageal fistula and subsequent insertion of an alaryngeal speech prosthesis (eg, voice button, Duane-Mendoza prosthesis) (05/26/2017), Drainage of Larynx, Percutaneous Approach (05/26/2017), Tracheoesophageal Puncture (.) (05/26/2017), Laryngectomy/Tracheostomy (11/08/2016), Thyroid surgery (07/01/2015), Lumpectomy-L breast (10/31/2011), Hysterectomy, Tonsillectomy and adenoidectomy; age 13 or over.  Medications  Allegra-D 12 Hour oral tablet, extended release,? ?Not taking  calcium carbonate 600 mg oral tablet, 1200 mg= 2 tab(s), Oral, Daily  DEXILANT CAP 60MG DR, Oral  exemestane 25 mg oral tablet, Oral, Daily  famotidine 40 mg/5 mL oral liquid, 20 mg= 2.5 mL, Oral, Daily  Fish Oil oral capsule, 1 cap(s), Oral, Daily  Inderal  mg oral capsule, extended release  levothyroxine 100 mcg (0.1 mg) oral tablet, 100 mcg= 1 tab(s), Oral, Daily  loratadine 10 mg oral capsule, Oral, Daily  Lyrica 100 mg oral capsule, 0.5 tab(s), Oral,  Daily  Nasonex 50 mcg/inh nasal spray, 2 spray(s), Nasal, Daily  Ofirmev, 1000 mg= 100 mL, IV Piggyback, Once  PILOCARPINE TAB 5MG, 5 mg= 1 tab(s), Oral, TID  TOLTERODINE CAP 4MG ER, 4 mg= 1 cap(s), Oral, Daily,? ?Not taking  Toviaz 4 mg oral tablet, extended release, 4 mg= 1 tab(s), Oral, Daily,? ?Not taking  Allergies  No Known Allergies  Social History  Alcohol - Denies Alcohol Use, 03/19/2014  Home/Environment  Lives with Spouse., 08/10/2017  Living situation: Home/Independent., 07/24/2017  Substance Abuse - Denies Substance Abuse, 03/19/2014  Never, 07/24/2017  Tobacco - Denies Tobacco Use, 03/19/2014  Never smoker, 07/06/2016  Family History  Acute myocardial infarction.: Father and Brother.  Hypertension.: Father.  Primary malignant neoplasm of breast: Mother.

## 2022-06-02 ENCOUNTER — TELEPHONE (OUTPATIENT)
Dept: HEMATOLOGY/ONCOLOGY | Facility: CLINIC | Age: 87
End: 2022-06-02
Payer: MEDICARE

## 2022-06-27 RX ORDER — ALPRAZOLAM 0.25 MG/1
.125-.25 TABLET ORAL 2 TIMES DAILY PRN
COMMUNITY
Start: 2022-03-11

## 2022-06-27 RX ORDER — PREGABALIN 50 MG/1
CAPSULE ORAL
COMMUNITY
Start: 2022-01-18 | End: 2022-07-05 | Stop reason: SDUPTHER

## 2022-06-27 RX ORDER — METOPROLOL SUCCINATE 50 MG/1
75 TABLET, EXTENDED RELEASE ORAL DAILY
COMMUNITY
Start: 2022-03-06

## 2022-06-27 RX ORDER — FAMOTIDINE 40 MG/5ML
20 POWDER, FOR SUSPENSION ORAL ONCE AS NEEDED
COMMUNITY
Start: 2022-05-24

## 2022-06-27 RX ORDER — PILOCARPINE HYDROCHLORIDE 5 MG/1
5 TABLET, FILM COATED ORAL 2 TIMES DAILY
COMMUNITY
Start: 2022-03-15

## 2022-06-27 RX ORDER — LEVOTHYROXINE SODIUM 112 UG/1
112 TABLET ORAL
COMMUNITY
Start: 2022-04-16 | End: 2024-01-25

## 2022-06-27 RX ORDER — SUCRALFATE 1 G/1
1 TABLET ORAL 4 TIMES DAILY
COMMUNITY
Start: 2022-04-25

## 2022-07-05 ENCOUNTER — OFFICE VISIT (OUTPATIENT)
Dept: NEUROLOGY | Facility: CLINIC | Age: 87
End: 2022-07-05
Payer: MEDICARE

## 2022-07-05 VITALS
BODY MASS INDEX: 17.94 KG/M2 | SYSTOLIC BLOOD PRESSURE: 110 MMHG | WEIGHT: 118.38 LBS | DIASTOLIC BLOOD PRESSURE: 60 MMHG | HEART RATE: 68 BPM | HEIGHT: 68 IN

## 2022-07-05 DIAGNOSIS — D32.9 MENINGIOMA: ICD-10-CM

## 2022-07-05 DIAGNOSIS — G62.9 NEUROPATHY: Primary | ICD-10-CM

## 2022-07-05 PROCEDURE — 99999 PR PBB SHADOW E&M-EST. PATIENT-LVL IV: ICD-10-PCS | Mod: PBBFAC,,, | Performed by: PSYCHIATRY & NEUROLOGY

## 2022-07-05 PROCEDURE — 99213 OFFICE O/P EST LOW 20 MIN: CPT | Mod: S$PBB,,, | Performed by: PSYCHIATRY & NEUROLOGY

## 2022-07-05 PROCEDURE — 99213 PR OFFICE/OUTPT VISIT, EST, LEVL III, 20-29 MIN: ICD-10-PCS | Mod: S$PBB,,, | Performed by: PSYCHIATRY & NEUROLOGY

## 2022-07-05 PROCEDURE — 99214 OFFICE O/P EST MOD 30 MIN: CPT | Mod: PBBFAC | Performed by: PSYCHIATRY & NEUROLOGY

## 2022-07-05 PROCEDURE — 99999 PR PBB SHADOW E&M-EST. PATIENT-LVL IV: CPT | Mod: PBBFAC,,, | Performed by: PSYCHIATRY & NEUROLOGY

## 2022-07-05 RX ORDER — TOLTERODINE 4 MG/1
CAPSULE, EXTENDED RELEASE ORAL
COMMUNITY

## 2022-07-05 RX ORDER — NITROFURANTOIN 25; 75 MG/1; MG/1
100 CAPSULE ORAL 2 TIMES DAILY
COMMUNITY
Start: 2022-07-01 | End: 2023-07-10

## 2022-07-05 RX ORDER — MELOXICAM 7.5 MG/1
7.5 TABLET ORAL DAILY
COMMUNITY

## 2022-07-05 RX ORDER — PREGABALIN 50 MG/1
100 CAPSULE ORAL 2 TIMES DAILY
Qty: 360 CAPSULE | Refills: 3 | Status: SHIPPED | OUTPATIENT
Start: 2022-07-05 | End: 2023-03-28

## 2022-07-05 RX ORDER — OMEPRAZOLE AND SODIUM BICARBONATE 40; 1100 MG/1; MG/1
1 CAPSULE ORAL ONCE AS NEEDED
COMMUNITY

## 2022-07-05 RX ORDER — METHENAMINE, SODIUM PHOSPHATE MONOBASIC, PHENYL SALICYLATE, METHYLENE BLUE, HYOSCYAMINE SULFATE 81.6; 40.8; 36.2; 10.8; .12 MG/1; MG/1; MG/1; MG/1; MG/1
TABLET ORAL
COMMUNITY
End: 2022-07-05

## 2022-07-05 RX ORDER — FESOTERODINE FUMARATE 4 MG/1
TABLET, FILM COATED, EXTENDED RELEASE ORAL
COMMUNITY

## 2022-07-05 RX ORDER — LEVOFLOXACIN 500 MG/1
TABLET, FILM COATED ORAL
COMMUNITY
End: 2024-01-25

## 2022-07-05 RX ORDER — ALBUTEROL SULFATE 0.83 MG/ML
2.5 SOLUTION RESPIRATORY (INHALATION) EVERY 6 HOURS PRN
COMMUNITY

## 2022-07-05 RX ORDER — CALCIUM CARBONATE 600 MG
600 TABLET ORAL
COMMUNITY

## 2022-07-05 RX ORDER — METRONIDAZOLE 7.5 MG/G
GEL VAGINAL
COMMUNITY
End: 2023-07-10

## 2022-07-05 RX ORDER — LOPERAMIDE HYDROCHLORIDE 2 MG/1
2 CAPSULE ORAL ONCE AS NEEDED
COMMUNITY

## 2022-07-05 RX ORDER — PROPRANOLOL HYDROCHLORIDE 160 MG/1
CAPSULE, EXTENDED RELEASE ORAL
COMMUNITY
End: 2022-07-05

## 2022-07-05 RX ORDER — SULFAMETHOXAZOLE AND TRIMETHOPRIM 800; 160 MG/1; MG/1
TABLET ORAL
COMMUNITY
End: 2022-07-05

## 2022-07-05 RX ORDER — FEXOFENADINE HCL AND PSEUDOEPHEDRINE HCI 60; 120 MG/1; MG/1
1 TABLET, EXTENDED RELEASE ORAL
COMMUNITY

## 2022-07-05 RX ORDER — EXEMESTANE 25 MG/1
25 TABLET ORAL DAILY
COMMUNITY
End: 2024-01-25

## 2022-07-05 RX ORDER — MOMETASONE FUROATE 50 UG/1
SPRAY, METERED NASAL
COMMUNITY

## 2022-07-05 NOTE — PROGRESS NOTES
Chief Complaint   Patient presents with    Peripheral Neuropathy     Still having difficulty with neuropathy increase of lyrica was somewhat helpful; radiates to knees burning to bilateral feet and ankles; some difficulty with staying a sleep; current pain 4/10 it is worse at night when she is going to bed; some difficulties with balance         This is a 86 y.o. female here for follow up for This is a 85-year-old woman with a history of thyroid cancer diagnosed 2015 status thyroidectomy, breast cancer diagnosed 2011 status post lumpectomy, history of DVT, history of meningioma. MRI 3/10/2020 showed stable right posterior fossa CPA 1.1 cm meningioma, right frontal parietal convexity 2 cm meningioma.    comes here for neuropathy. Lyrica helping 100 BID. Denies side effects. ramul lost her  in march so grieving    She has 7 children, son killed age 29.    She previously worked as a teacher.          Medication List with Changes/Refills   Current Medications    ALBUTEROL (PROVENTIL) 2.5 MG /3 ML (0.083 %) NEBULIZER SOLUTION    Inhale 2.5 mg into the lungs every 6 (six) hours as needed.    ALPRAZOLAM (XANAX) 0.25 MG TABLET    Take 0.125-0.25 mg by mouth 2 (two) times daily.    CALCIUM CARBONATE (OS-INDU) 600 MG CALCIUM (1,500 MG) TAB    Take 600 mg by mouth.    EXEMESTANE (AROMASIN) 25 MG TABLET    Take 25 mg by mouth once daily.    FAMOTIDINE (PEPCID) 40 MG/5 ML (8 MG/ML) SUSPENSION    Take 20 mg by mouth once as needed.    FESOTERODINE (TOVIAZ) 4 MG TB24    Toviaz 4 mg tablet,extended release    FEXOFENADINE-PSEUDOEPHEDRINE  MG (ALLEGRA-D)  MG PER TABLET    Take 1 tablet by mouth.    LEVOFLOXACIN (LEVAQUIN) 500 MG TABLET    levofloxacin 500 mg tablet    LOPERAMIDE (IMODIUM) 2 MG CAPSULE    Take 2 mg by mouth.    LORATADINE-PSEUDOEPHEDRINE  MG (CLARITIN-D 24-HOUR)  MG PER 24 HR TABLET    Take 1 tablet by mouth.    MELOXICAM (MOBIC) 7.5 MG TABLET    meloxicam 7.5 mg tablet    METOPROLOL  SUCCINATE (TOPROL-XL) 50 MG 24 HR TABLET    Take 75 mg by mouth once daily.    METRONIDAZOLE (METROGEL) 0.75 % VAGINAL GEL    metronidazole 0.75 % vaginal gel    MOMETASONE (NASONEX) 50 MCG/ACTUATION NASAL SPRAY    mometasone 50 mcg/actuation nasal spray    NITROFURANTOIN, MACROCRYSTAL-MONOHYDRATE, (MACROBID) 100 MG CAPSULE    Take 100 mg by mouth 2 (two) times daily.    OMEPRAZOLE-SODIUM BICARBONATE (ZEGERID) 40-1.1 MG-GRAM PER CAPSULE    Take 1 capsule by mouth once as needed.    PILOCARPINE (SALAGEN) 5 MG TAB    Take 5 mg by mouth 2 (two) times daily.    SUCRALFATE (CARAFATE) 1 GRAM TABLET    Take 1 g by mouth 4 (four) times daily.    SYNTHROID 112 MCG TABLET    Take by mouth.    TOLTERODINE (DETROL LA) 4 MG 24 HR CAPSULE    tolterodine ER 4 mg capsule,extended release 24 hr   Changed and/or Refilled Medications    Modified Medication Previous Medication    PREGABALIN (LYRICA) 50 MG CAPSULE pregabalin (LYRICA) 50 MG capsule       Take 2 capsules (100 mg total) by mouth 2 (two) times daily.      See Instructions, 2 cap(s) Oral Q AM, 2 caps oral q PM, # 360 tab(s), 5 Refill(s), Pharmacy: [facility]/pharmacy #[id number], 172.72, cm, Height/Length Dosing, 12/17/21 9:28:00 CST, 55.4, kg, Weight Dosing, 12/17/21 9:28:00 CST   Discontinued Medications    METHEN-M.BLUE-S.PHOS-PHSAL-HYO (UTIRA-C) 81.6-10.8-40.8 MG TAB    Utira-C 81.6 mg-10.8 mg-40.8 mg tablet    PROPRANOLOL (INDERAL LA) 160 MG 24 HR CAPSULE    propranolol  mg capsule,24 hr,extended release    SULFAMETHOXAZOLE-TRIMETHOPRIM 800-160MG (BACTRIM DS) 800-160 MG TAB    sulfamethoxazole 800 mg-trimethoprim 160 mg tablet        Vitals:    07/05/22 0857   BP: 110/60   Pulse: 68        General: alert and oriented, no acute distress, no audible wheezes, pulse intact, no edema    Cognition and Comprehension  Speech and language intact  Follows commands  Speech fluent  Attention intact  Memory for recent events intact from history taking  Affect pleasant  Fund of knowledge  adequate    Cranial nerves  II. Optic: Visual fields full to confrontation both eyes  III, IV, VI. Oculomotor: Intact, Pupils equal, round and reactive to light, no nystagmus  V. Trigeminal: sensation to light touch normal  VII. No facial asymmetry or facial weakness  VIII. Hearing intact to spoken voice  IX/X. Glossopharyngeal/Vagus: trach  XI. Axillary: Shoulder shrug normal  XII. Hypoglossal: Intact    Muscle Strength and Tone  Normal upper extremity tone  Normal lower extremity tone  Normal upper extremity strength  Normal lower extremity strength    Sensation  Decreased sensation to pinprick and vibration in a gradient distribution in BLE    Reflexes  Normal and symmetric    Coordination and Gait  Finger to nose normal  Gait normal     1. Neuropathy  -     pregabalin (LYRICA) 50 MG capsule    2. Meningioma  Overview:  Stable on last scan 2020 followed by NSGY     Assessment & Plan:  No sx, followed by NSGY       Cont Lyrica 100 BID

## 2022-07-08 ENCOUNTER — CLINICAL SUPPORT (OUTPATIENT)
Dept: REHABILITATION | Facility: HOSPITAL | Age: 87
End: 2022-07-08
Attending: OTOLARYNGOLOGY
Payer: MEDICARE

## 2022-07-08 DIAGNOSIS — Z90.02 H/O LARYNGECTOMY: Primary | ICD-10-CM

## 2022-07-08 DIAGNOSIS — R49.1 APHONIA: Primary | ICD-10-CM

## 2022-07-08 PROCEDURE — 92507 TX SP LANG VOICE COMM INDIV: CPT

## 2022-07-08 PROCEDURE — L8509 TRACH-ESOPH VOICE PROS MD IN: HCPCS

## 2022-07-08 NOTE — PROGRESS NOTES
[facility]  Speech Therapy Treatment Note  Laryngectomy  Date:  7/8/2022     PATIENT IS A NECK-BREATHER - DO NOT ORALLY INTUBATE    Name: Raymundo Arechiga   MRN: [id number]   Therapy Diagnosis: s/p Total laryngectomy  Encounter Diagnosis   Name Primary?    H/O laryngectomy Yes     Mrs. Arechiga is an 87 y/o female, referred for ST evaluation and treatment by Dr. Hart due to history of thyroid cancer, post-laryngectomy, management of TEP. 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 Surgical resection of 2 left nodes due to concern for recurrence 6-5-18 at [facility], per patient report.  No changes in previous brain lesions, determined to be benign.    Physician: Patrick Hart Jr., MD    Time In:  0800  Time Out:  0850  Total Billable Time: 50     Precautions: Standard  Subjective:   Pt reports: leaking for approximately  3 weeks    Pain Scale:  0/10 on VAS currently.   Pain Location: na    SURGICAL HISTORY  Past Surgical History:   Procedure Laterality Date    BREAST LUMPECTOMY      FESS, WITH DACRYOCYSTORHINOSTOMY      HYSTERECTOMY      LARYNGECTOMY      thyroid cancer surgery      TONSILLECTOMY AND ADENOIDECTOMY          DYSPHAGIA:   No complaints of dysphagia    Objective:        UNTIMED  Procedure Min.   Speech- Language- Voice Therapy  50     Total Untimed Units: 50  Charges Billed/# of units: 1      Long Term Goals:  Current Progress:   1.  Patient will utilize alaryngeal communication via TEP to express needs and desires without respiratory compromise >90% of the time.  Progressing towards goal         Short Term Goals:  Current Progress:   1. Patient will  demonstrate care and use of HME system for maximum pulmonary benefit >90% of the time. Progressing towards goal   2.  Patient will demonstrate adequate care and use of TEP to maintain TEP voicing >90% of the time.  Progressing towards goal   3.  Patient will demonstrate adequate occlusion and cleaning of TEP to maintain voicing >90% of the time.  Progressing towards goal     Patient Education/Response:   Education completed: yes Plan of Care, role of SLP in care and TEP and stomal care, Guido tube usage at night for stomal patency.     Barriers to Learning: NA    Teaching Method: Verbal    Assessment:   Raymundo is progressing well towards her goals. Current goals remain appropriate. Goals to be updated as necessary.     TEP Assessment:    Initial Fitting: no    Re-fitting:yes    TEP Initial Fitting Date: 05/26/2017    TEP Current Status:Mrs. Arechiga is currently wearing a 20FR, 10mm Provox Delarosa. Additionally, she wears Optiderm adhesive with push to talk HMEs.     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  She is currently utilizing esophageal speech via a voice prosthesis for alaryngeal communication for functional communication with her family, friends, medical providers, and others to perform her daily life activities.  She requires the intermittent use of a laryngectomy tube at all times to keep the airway patent and prevent stomal stenosis. HMEs are required daily for  pulmonary optimization     Medical necessity is demonstrated by the following IMPAIRMENTS:  Total laryngectomy  Barriers to Therapy: NA  Pt's spiritual, cultural and educational needs considered and pt agreeable to plan of care and goals.Yes    Additional Information:     Mrs. Arechiga entered session this date reported TEP leaking for approximately 3 weeks. SLP educated pt on usage of TEP plug via samples, pt able to demonstrate adequate placement. SLP assessing TEP and sizing remains adequate at this time. SLP replacing without incident this date. SLP to follow up as warranted.     Plan:   Continue POC with focus on alaryngeal communication, TEP management and patient education.     Danisha Camacho MS, CCC-SLP  7/8/2022

## 2022-07-18 ENCOUNTER — CLINICAL SUPPORT (OUTPATIENT)
Dept: REHABILITATION | Facility: HOSPITAL | Age: 87
End: 2022-07-18
Payer: MEDICARE

## 2022-07-18 DIAGNOSIS — Z90.02 H/O LARYNGECTOMY: Primary | ICD-10-CM

## 2022-07-18 PROCEDURE — 97535 SELF CARE MNGMENT TRAINING: CPT

## 2022-07-18 NOTE — PROGRESS NOTES
[facility]  Speech Therapy Treatment Note  Laryngectomy  Date:  7/18/2022     PATIENT IS A NECK-BREATHER - DO NOT ORALLY INTUBATE    Name: Raymundo Arechiga   MRN: [id number]   Therapy Diagnosis: s/p Total Laryngectomy  Physician: AUSTIN Hart    Time In:  1300  Time Out:  1400  Total Billable Time: 60     Precautions: Standard  Subjective:   Pt reports: Pt 3 daughters and neighbor entered for training since patient's spouses death     Pain Scale:  0/10 on VAS currently.   Pain Location: NA    SURGICAL HISTORY  Past Surgical History:   Procedure Laterality Date    BREAST LUMPECTOMY      FESS, WITH DACRYOCYSTORHINOSTOMY      HYSTERECTOMY      LARYNGECTOMY      thyroid cancer surgery      TONSILLECTOMY AND ADENOIDECTOMY          DYSPHAGIA:   No complaints of dysphagia    Objective:        UNTIMED  Procedure Min.   Patient/Family Education  60     Total Untimed Units: 60  Charges Billed/# of units: 4      Long Term Goals:  Current Progress:   1.  Patient will utilize alaryngeal communication via TEP to express needs and desires without respiratory compromise >90% of the time.  Progressing towards goal         Short Term Goals:  Current Progress:   1. Patient will demonstrate care and use of HME system for maximum pulmonary benefit >90% of the time. Progressing towards goal   2.  Patient will demonstrate adequate care and use of TEP to maintain TEP voicing >90% of the time.  Progressing towards goal   3.  Patient will demonstrate adequate occlusion and cleaning of TEP to maintain voicing >90% of the time.  Progressing towards goal   4. Patient will increase laryngectomy tube usage daily or PRN to maintain and increase stomal patency.  Progressing towards goal     Patient Education/Response:   Education completed: yes Plan of Care, role of SLP in care and stoma care, TEP management, RRC placement in case of TEP dislodgement, use of TEP plug     Barriers to Learning: NA    Teaching Method: Verbal,  Demonstration, Audio/Visual    Assessment:   Raymundo is progressing well towards her goals. Current goals remain appropriate. Goals to be updated as necessary.     TEP Assessment:    Initial Fitting: no    Re-fitting:no    TEP Initial Fitting Date: 05/26/2017    TEP Current Status:Ms. Arechiga is currently wearing a 20FR, 10mm Provox Delarosa placed on 7/7/2022. Additionally, she wears a Optiderm Adhesives and Push to Talk HMEs.     TEP Patient Complaints:NA    TEP Accessories: Optiderm Adhesives and Push to Talk HMEs    Stoma Size:  adequate yet narrow     Lymphedema:  no    TEP Fitting/Re-sizing:     Removed current TEP: no    TEP Removed catheter : no     TEP Sizing:no     Puncture Dilation: no     TEP Prosthesis Placed:no     TEP Voicing with Open Tract:no     TEP Voicing with Prosthesis:yes     TEP Leaking through Prothesis:no     TEP Leaking around Prosthesis:no     Mrs. 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  She is currently utilizing esophageal speech via a voice prosthesis for alaryngeal communication for functional communication with her family, friends, medical providers, and others to perform her daily life activities.  Mrs. Arechiga requires the intermittent use of a laryngectomy tube at all times to keep the airway patent and prevent stomal stenosis. HMEs are required daily for pulmonary optimization     Medical necessity is demonstrated by the following IMPAIRMENTS:  Total laryngectomy  Barriers to Therapy: NA  Pt's spiritual, cultural and educational needs considered and pt agreeable to plan of care and goals.Yes    Additional Information:     Mrs. Arechiga entered this date with her 3 daughters and her neighbor for laryngectomy education due to the recent death of her spouse. Educated patient on pre and post-op anatomy with the use of illustrations, discussing changes in communication, swallowing, and pulmonary function. Educational written material provided for patient, daughters and her  neighbor including those provided by [employer] and [employer].     Educated patient, daughters and her neighbor on communication options following total laryngectomy, which include TEP (trachea-esophageal voice prosthesis). Patient currently using TEP for alaryngeal verbal communication. Educated patient, daughters and her neighbor on use and care of TEP. Samples of TEP and cleaning brush provided for all to see.      Educated patient on post-op changes in pulmonary function - primary and single respiratory tract is through stoma, kept patent through the use of the laryngectomy tube.   HME cassettes provide moisture and humidification and should be worn 24 hours daily, changed every 24 hours for maximum benefit and mucus management. Patient currently uses Optiderm adhesive with push to talk HME cassettes and intermittently wears guido tube at night. Samples of guido tube and HMEs provided for patient to see.     Education provided on prosthesis dislodgement and demonstrated placement of RRC in case of such event.     Education on usage of Provox Plug when TEP leaking. SLP providing samples for patient, daughters and her neighbor to manipulate.     All comments and questions addressed during session. Comprehension demonstrated at this time. SLP to follow up as warranted.     Plan:   Continue POC with focus on alaryngeal communication, TEP management and patient and family education.     Danisha Camacho MS, CCC-SLP  7/18/2022

## 2022-09-19 ENCOUNTER — CLINICAL SUPPORT (OUTPATIENT)
Dept: REHABILITATION | Facility: HOSPITAL | Age: 87
End: 2022-09-19
Payer: MEDICARE

## 2022-09-19 DIAGNOSIS — Z90.02 H/O LARYNGECTOMY: Primary | ICD-10-CM

## 2022-09-19 PROCEDURE — L8509 TRACH-ESOPH VOICE PROS MD IN: HCPCS

## 2022-09-19 PROCEDURE — 92507 TX SP LANG VOICE COMM INDIV: CPT

## 2022-09-19 NOTE — PROGRESS NOTES
[facility]  Speech Therapy Treatment Note  Laryngectomy  Date:  9/19/2022     PATIENT IS A NECK-BREATHER - DO NOT ORALLY INTUBATE    Name: Raymundo Arechiga   MRN: [id number]   Therapy Diagnosis: s/p Total laryngectomy  Encounter Diagnosis   Name Primary?    H/O laryngectomy Yes     Mrs. Arechiga is an 85 y/o female, referred for ST evaluation and treatment by Dr. Hart due to history of thyroid cancer, post-laryngectomy, management of TEP. 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 Surgical resection of 2 left nodes due to concern for recurrence 6-5-18 at [facility], per patient report.  No changes in previous brain lesions, determined to be benign.    Physician: Patrick Hart Jr., MD    Time In:  0900  Time Out:  0930  Total Billable Time: 30     Precautions: Standard  Subjective:   Pt reports: leaking for approximately 2 weeks and was concerned about prothesis being too long    Pain Scale:  0/10 on VAS currently.   Pain Location: na    SURGICAL HISTORY  Past Surgical History:   Procedure Laterality Date    BREAST LUMPECTOMY      FESS, WITH DACRYOCYSTORHINOSTOMY      HYSTERECTOMY      LARYNGECTOMY      thyroid cancer surgery      TONSILLECTOMY AND ADENOIDECTOMY          DYSPHAGIA:   No complaints of dysphagia    Objective:        UNTIMED  Procedure Min.   Speech- Language- Voice Therapy  30     Total Untimed Units: 30  Charges Billed/# of units: 1      Long Term Goals:  Current Progress:   1.  Patient will utilize alaryngeal communication via TEP to express needs and desires without respiratory compromise >90% of the time.  Progressing towards goal         Short Term Goals:   Current Progress:   1. Patient will demonstrate care and use of HME system for maximum pulmonary benefit >90% of the time. Progressing towards goal   2.  Patient will demonstrate adequate care and use of TEP to maintain TEP voicing >90% of the time.  Progressing towards goal   3.  Patient will demonstrate adequate occlusion and cleaning of TEP to maintain voicing >90% of the time.  Progressing towards goal     Patient Education/Response:   Education completed: yes Plan of Care, role of SLP in care and TEP and stomal care, SLP educated Ms. Arechiga on rationale behind having some room for the prothesis to move; it can prevent irritation. SLP instructed pt to notify SLP if the extra length starts to cause dysphagia. Pt expressed understanding and opted to keep the same length.    Barriers to Learning:  NA    Teaching Method: Verbal    Assessment:   Raymundo is progressing well towards her goals. Current goals remain appropriate. Goals to be updated as necessary.     TEP Assessment:    Initial Fitting: no    Re-fitting: no    TEP Initial Fitting Date: 05/26/2017    TEP Current Status:Mrs. Arechiga is currently wearing a 20FR, 10mm Provox Delarosa. Additionally, she wears Optiderm adhesive with push to talk HMEs.     TEP Patient Complaints: prosthesis leaking; concerned about length of prosthesis    TEP Accessories: Optiderm adhesive with push to talk HMEs    Stoma Size:  narrow     Lymphedema:  no    TEP Fitting/Re-sizing:     Removed current TEP: yes minimal biofilm noted on posterior phalange of prosthesis    TEP Removed catheter : no     TEP Sizing:yes 10mm    Puncture Dilation: no     TEP Prosthesis Placed:yes 20FR, 10mm    TEP Voicing with Open Tract:no     TEP Voicing with Prosthesis:yes     TEP Leaking through Prothesis:no     TEP Leaking around Prosthesis:no       Mrs. 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  She is currently utilizing esophageal speech via a voice prosthesis for alaryngeal communication  for functional communication with her family, friends, medical providers, and others to perform her daily life activities.  She requires the intermittent use of a laryngectomy tube at all times to keep the airway patent and prevent stomal stenosis. HMEs are required daily for pulmonary optimization     Medical necessity is demonstrated by the following IMPAIRMENTS:  Total laryngectomy  Barriers to Therapy: NA  Pt's spiritual, cultural and educational needs considered and pt agreeable to plan of care and goals.Yes    Additional Information:     Mrs. Arechiga entered session this date reported TEP leaking for approximately 2 weeks. SLP assessing TEP and sizing remains adequate at this time. SLP replacing without incident this date. SLP to follow up as warranted. SLP provided Ms. Arechiga with samples of the Provox Life Sensitive adhesives and protect HMEs.     Plan:   Continue POC with focus on alaryngeal communication, TEP management and patient education.     Danisha Camacho MS, CCC-SLP  9/19/2022

## 2022-11-23 ENCOUNTER — CLINICAL SUPPORT (OUTPATIENT)
Dept: REHABILITATION | Facility: HOSPITAL | Age: 87
End: 2022-11-23
Payer: MEDICARE

## 2022-11-23 DIAGNOSIS — Z90.02 H/O LARYNGECTOMY: Primary | ICD-10-CM

## 2022-11-23 PROCEDURE — 92597 ORAL SPEECH DEVICE EVAL: CPT

## 2022-11-23 PROCEDURE — L8509 TRACH-ESOPH VOICE PROS MD IN: HCPCS

## 2022-11-23 NOTE — PROGRESS NOTES
[facility]  Speech Therapy Evaluation   Laryngectomy  Date: 11/23/2022     Name: Raymundo Arechiga   MRN: [id number]    Therapy Diagnosis:   Encounter Diagnosis   Name Primary?    H/O laryngectomy Yes      Physician: Patrick Hart Jr., MD    PATIENT IS A NECK-BREATHER - DO NOT ORALLY INTUBATE    Time In:  1300   Time Out:  1350     Procedure Min.   Prosthesis Evaluation  50   Total Untimed Units: 50  Charges Billed/# of units: 50/1    Precautions: Standard  Subjective    Chief Complaint: prosthesis leaking for 2-3 weeks     AFFECTnormal affect    Pain:   0/10  Pain Location / Description: NA  Current Medical History: Raymundo Arechiga is a 87 y.o. female referred by Dr. Hart for TEP management to maximize alaryngeal communication without respiratory compromise.    Past Medical History:   Mrs. Arechiga is an 87 y/o female, referred for ST evaluation and treatment by Dr. Hart due to history of thyroid cancer, post-laryngectomy, management of TEP. 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 Surgical resection of 2 left nodes due to concern for recurrence 6-5-18 at [facility], per patient report.  No changes in previous brain lesions, determined to be benign.  Past Medical History:   Diagnosis Date    Breast cancer     DVT (deep venous thrombosis)     Hyperlipidemia     Mitral valve prolapse     Osteoarthritis of knee     Peripheral neuropathy     Raymundo Arechiga  has a past surgical history that includes fess, with dacryocystorhinostomy; Hysterectomy; Laryngectomy; Breast lumpectomy; thyroid cancer surgery; and Tonsillectomy and adenoidectomy.  Medical Hx and Allergies: Raymundo has a current  medication list which includes the following prescription(s): albuterol, alprazolam, calcium carbonate, exemestane, famotidine, toviaz, fexofenadine-pseudoephedrine  mg, levofloxacin, loperamide, loratadine-pseudoephedrine  mg, meloxicam, metoprolol succinate, metronidazole, mometasone, nitrofurantoin (macrocrystal-monohydrate), omeprazole-sodium bicarbonate, pilocarpine, pregabalin, sucralfate, synthroid, and tolterodine. Review of patient's allergies indicates:  No Known Allergies    Current Voice Function: alaryngeal communication via voice prothesis    Current Level of Swallow Function/Complaints:  no complaints of dysphagia  Prior Therapy:  09/19/2022    TEP ASSESSMENT   Initial Fitting: no    Re-fitting:yes    TEP Initial Fitting Date: 05/26/2017    TEP Current Status: Mrs. Arechiga is currently wearing a 20FR, 10mm Provox Delarosa. Additionally, she wears Optiderm Sensitive Adhesive with push to talk HMEs and occasionally wears her guido tube.     TEP Patient Complaints:prosthesis leaking    TEP Accessories: Optiderm Sensitive Adhesive with push to talk HMEs    Stoma Size:  oblong and slightly narrow     Lymphedema:  no    TEP Fitting/Re-sizing:     Removed current TEP: yes minimal biofilm noted esophageal phalange    TEP Removed catheter : no     TEP Sizing:yes 10mm    Puncture Dilation: no     TEP Prosthesis Placed:yes 20FR, 10mm Provox Delarosa    TEP Voicing with Open Tract:no     TEP Voicing with Prosthesis:yes good voicing noted after placement    TEP Leaking through Prothesis:no     TEP Leaking around Prosthesis:no       Treatment   Total Treatment Time Separate from Evaluation: n/a   no treatment performed 2/2 time to complete evaluation.    Education: Plan of Care, role of SLP in care, and TEP and stomal care  were discussed with pt. Patient expressed understanding.     Assessment       LongTerm Goals:  Current Progress:   1.  Patient will utilize alaryngeal communication via TEP to express needs and  desires without respiratory compromise >90% of the time.  Progressing towards goal       Short Term Goals:  Current Progress:   1. Patient will demonstrate care and use of HME system for maximum pulmonary benefit >90% of the time. Progressing towards goal   2.  Patient will demonstrate adequate care and use of TEP to maintain TEP voicing >90% of the time.  Progressing towards goal   3.  Patient will demonstrate adequate occlusion and cleaning of TEP to maintain voicing >90% of the time.  Progressing towards goal   4. Patient will increase laryngectomy tube usage daily or PRN to maintain and increase stomal patency.  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  She is currently utilizing esophageal speech via a voice prosthesis for alaryngeal communication for functional communication with her family, friends, medical providers, and others to perform her daily life activities.  She requires the intermittent use of a laryngectomy tube at all times to keep the airway patent and prevent stomal stenosis. HMEs are required daily for pulmonary optimization      Plan     SLP intervention is warranted 1-2 times a month or PRN for communication needs for a minimum of 1 year due to the chronic nature of the impairment.     Additional Information     Mrs. Arechiga entered and reported leaking for approximately 2-3 weeks. SLP educated to no wait to have TEP changed. SLP assessed TEP and noted size remains adequate this time. TEP changed without incident this date. SLP to follow up at this time.     Danisha Camacho CCC-SLP   11/23/2022

## 2022-12-20 ENCOUNTER — TELEPHONE (OUTPATIENT)
Dept: HEMATOLOGY/ONCOLOGY | Facility: CLINIC | Age: 87
End: 2022-12-20
Payer: MEDICARE

## 2022-12-27 ENCOUNTER — TELEPHONE (OUTPATIENT)
Dept: HEMATOLOGY/ONCOLOGY | Facility: CLINIC | Age: 87
End: 2022-12-27
Payer: MEDICARE

## 2023-01-19 DIAGNOSIS — C50.412 MALIGNANT NEOPLASM OF UPPER-OUTER QUADRANT OF LEFT BREAST IN FEMALE, ESTROGEN RECEPTOR POSITIVE: ICD-10-CM

## 2023-01-19 DIAGNOSIS — C73 THYROID CANCER: ICD-10-CM

## 2023-01-19 DIAGNOSIS — C50.919 MALIGNANT NEOPLASM OF BREAST IN FEMALE, ESTROGEN RECEPTOR POSITIVE, UNSPECIFIED LATERALITY, UNSPECIFIED SITE OF BREAST: Primary | ICD-10-CM

## 2023-01-19 DIAGNOSIS — Z17.0 MALIGNANT NEOPLASM OF BREAST IN FEMALE, ESTROGEN RECEPTOR POSITIVE, UNSPECIFIED LATERALITY, UNSPECIFIED SITE OF BREAST: Primary | ICD-10-CM

## 2023-01-19 DIAGNOSIS — Z17.0 MALIGNANT NEOPLASM OF UPPER-OUTER QUADRANT OF LEFT BREAST IN FEMALE, ESTROGEN RECEPTOR POSITIVE: ICD-10-CM

## 2023-01-20 ENCOUNTER — LAB VISIT (OUTPATIENT)
Dept: LAB | Facility: HOSPITAL | Age: 88
End: 2023-01-20
Attending: INTERNAL MEDICINE
Payer: MEDICARE

## 2023-01-20 DIAGNOSIS — C73 THYROID CANCER: ICD-10-CM

## 2023-01-20 DIAGNOSIS — Z17.0 MALIGNANT NEOPLASM OF BREAST IN FEMALE, ESTROGEN RECEPTOR POSITIVE, UNSPECIFIED LATERALITY, UNSPECIFIED SITE OF BREAST: ICD-10-CM

## 2023-01-20 DIAGNOSIS — Z17.0 MALIGNANT NEOPLASM OF UPPER-OUTER QUADRANT OF LEFT BREAST IN FEMALE, ESTROGEN RECEPTOR POSITIVE: ICD-10-CM

## 2023-01-20 DIAGNOSIS — C50.412 MALIGNANT NEOPLASM OF UPPER-OUTER QUADRANT OF LEFT BREAST IN FEMALE, ESTROGEN RECEPTOR POSITIVE: ICD-10-CM

## 2023-01-20 DIAGNOSIS — C50.919 MALIGNANT NEOPLASM OF BREAST IN FEMALE, ESTROGEN RECEPTOR POSITIVE, UNSPECIFIED LATERALITY, UNSPECIFIED SITE OF BREAST: ICD-10-CM

## 2023-01-20 LAB
ALBUMIN SERPL-MCNC: 3.7 G/DL (ref 3.4–4.8)
ALBUMIN/GLOB SERPL: 1.2 RATIO (ref 1.1–2)
ALP SERPL-CCNC: 74 UNIT/L (ref 40–150)
ALT SERPL-CCNC: 23 UNIT/L (ref 0–55)
AST SERPL-CCNC: 23 UNIT/L (ref 5–34)
BASOPHILS # BLD AUTO: 0.04 X10(3)/MCL (ref 0–0.2)
BASOPHILS NFR BLD AUTO: 0.7 %
BILIRUBIN DIRECT+TOT PNL SERPL-MCNC: 0.7 MG/DL
BUN SERPL-MCNC: 19.2 MG/DL (ref 9.8–20.1)
CALCIUM SERPL-MCNC: 9.2 MG/DL (ref 8.4–10.2)
CHLORIDE SERPL-SCNC: 103 MMOL/L (ref 98–107)
CO2 SERPL-SCNC: 28 MMOL/L (ref 23–31)
CREAT SERPL-MCNC: 0.87 MG/DL (ref 0.55–1.02)
EOSINOPHIL # BLD AUTO: 0.1 X10(3)/MCL (ref 0–0.9)
EOSINOPHIL NFR BLD AUTO: 1.8 %
ERYTHROCYTE [DISTWIDTH] IN BLOOD BY AUTOMATED COUNT: 13.7 % (ref 11.5–17)
GFR SERPLBLD CREATININE-BSD FMLA CKD-EPI: >60 MLS/MIN/1.73/M2
GLOBULIN SER-MCNC: 3 GM/DL (ref 2.4–3.5)
GLUCOSE SERPL-MCNC: 121 MG/DL (ref 82–115)
HCT VFR BLD AUTO: 40.1 % (ref 37–47)
HGB BLD-MCNC: 12.6 GM/DL (ref 12–16)
IMM GRANULOCYTES # BLD AUTO: 0.01 X10(3)/MCL (ref 0–0.04)
IMM GRANULOCYTES NFR BLD AUTO: 0.2 %
LYMPHOCYTES # BLD AUTO: 1.45 X10(3)/MCL (ref 0.6–4.6)
LYMPHOCYTES NFR BLD AUTO: 26.1 %
MCH RBC QN AUTO: 28.4 PG
MCHC RBC AUTO-ENTMCNC: 31.4 MG/DL (ref 33–36)
MCV RBC AUTO: 90.5 FL (ref 80–94)
MONOCYTES # BLD AUTO: 0.5 X10(3)/MCL (ref 0.1–1.3)
MONOCYTES NFR BLD AUTO: 9 %
NEUTROPHILS # BLD AUTO: 3.46 X10(3)/MCL (ref 2.1–9.2)
NEUTROPHILS NFR BLD AUTO: 62.2 %
PLATELET # BLD AUTO: 211 X10(3)/MCL (ref 130–400)
PMV BLD AUTO: 8.8 FL (ref 7.4–10.4)
POTASSIUM SERPL-SCNC: 4 MMOL/L (ref 3.5–5.1)
PROT SERPL-MCNC: 6.7 GM/DL (ref 5.8–7.6)
RBC # BLD AUTO: 4.43 X10(6)/MCL (ref 4.2–5.4)
SODIUM SERPL-SCNC: 137 MMOL/L (ref 136–145)
WBC # SPEC AUTO: 5.6 X10(3)/MCL (ref 4.5–11.5)

## 2023-01-20 PROCEDURE — 80053 COMPREHEN METABOLIC PANEL: CPT

## 2023-01-20 PROCEDURE — 85025 COMPLETE CBC W/AUTO DIFF WBC: CPT

## 2023-01-20 PROCEDURE — 36415 COLL VENOUS BLD VENIPUNCTURE: CPT

## 2023-01-22 PROBLEM — C50.412 MALIGNANT NEOPLASM OF UPPER-OUTER QUADRANT OF LEFT BREAST IN FEMALE, ESTROGEN RECEPTOR POSITIVE: Status: ACTIVE | Noted: 2023-01-22

## 2023-01-22 PROBLEM — Z17.0 MALIGNANT NEOPLASM OF UPPER-OUTER QUADRANT OF LEFT BREAST IN FEMALE, ESTROGEN RECEPTOR POSITIVE: Status: ACTIVE | Noted: 2023-01-22

## 2023-01-22 NOTE — PROGRESS NOTES
Subjective:       Patient ID: Raymundo Arechiga is a 87 y.o. female.  Radiation oncologist: Dr. Chris Caldera  Primary Care: Dr. Svetlana Gramajo  Surgeon: Dr. Jan Roca  Endocrinologist: Dr. Kalen Garnett  Pulmonologist: Dr. Chandler Regan     1. Left breast cancer stage IA ([id number])--diagnosed 10/31/2011       Biopsy/histology: US guided biopsy left breast lesion done 10/31/2011--IDCA, grade 2, ER 86%, LA 82%, Her2 1+IHC.       Surgery/histology: Left  lumpectomy 11/21/2011, IDCA, low grade, 0.7 cm, 0 of 3 nodes, margins negative.                              DEXA:  9/30/14 - Osteopenia L2-4 T score of -1.22, AP spine -1.21, Left hip -1.11, L fem neck -2.12.   5/30/17 - Osteopenia L2-L4 T score of -1.32, Left hip -1.73, Left fem neck -2.41.     2. Recurrence Left Paratracheal 10/27/20  Recurrence Left Neck Nodules 6/2018  Thyroid Cancer diagnosed 7/14/15      Surgery/histology:   1. 7/2015--Right thyroid and isthmus--papillary thyroid carcinoma, well differentiated, classical type, 7.8cm, invades thyroid gland capsule, indeterminate lymphovascular invasion; Left thyroid lobectomy-nodular hyperplasia.  2. 11/8/16--[facility] S/p Total laryngectomy with tracheal resection of tracheal rings 1-4, bilateral revision level VI dissection, autotransplantation of bilateral inferior parathyroid glands in bilateral SCM and right pectoralis muscle flap. Pathology positive for papillary thyroid carcinoma, dominant follicular variant, involving laryngeal cartilage (2.5 cm in greatest dimension). Carcinoma was present in right first tracheal interspace, skeletal muscle, first 3 tracheal rings. 0:9 lymph nodes negative for tumor.   3. s/p Secondary TEP surgery done by Dr. Patrick Hart 5/26/17.  4. Left neck dissection and removal of nodules done 6/5/18 at [facility]--multifocal papillary thyroid carcinoma involving fibrous tissue and focal skeletal muscle.  5. Revision left and midline level VI and level VII neck dissection, multifocal left  paratracheal soft tissue disease grossly excised, midline superior mediastinal nodes removed done 10/27/20 at [facility]--pathology showed papillary thyroid carcinoma in fibrous tissue and muscle 2cm, may represent bed recurrence vs. gross replacement and extranodal extension of lymph node, superior mediastinal lymph nodes 6 negative for tumor.  6. Revision left central and right Level IV dissection on 21--Papillary thyroid carcinoma in fibrous tissue x 5 deposits measuring 0.6 cm, 0.6 cm, 0.7 cm, 0.8 cm and 0.8 cm. BRAF-mutated.     3. Meningiomas by MRI 2018     4. Bilateral Femoral DVT's 18--treated with 3 months Eliquis     Imagin. CT chest done at [facility] 11/2/15--LLL superior segment nodule/nodularity appears slightly more prominent than before measures 12X7mm, recommend close observation, stable precarinal lymph node, upper limits of normal.  2. CT chest 16--pulmonary nodule w/n LLL has diminished in volume.  3. CT chest 3/2/17--unchanged DEBORAH 4mm nodule.  4. PET/CT 18 [facility]--2 FDG-avid nodule left central compartment at total thyroidectomy bed, suggestive of metastatic disease, 2 additional small foci of mildly FDG-avid uptake in left neck, not specific but metastatic disease cannot be exluded, 2cm rounded lesion right frontal cortex without uptake, could represent metastasis vs. meningioma.  5. MRI brain w/ and w/o contrast [facility] 18--extra-axial mass overlying high right posterior frontal convexity c/w meningioma, thickened dural-based lesion seen overlying right petrous bone also c/w meningioma.  6. Venous doppler bilateral LE [facility] 18--occlusive DVT distal left femoral vein, near occlusive DVT distal right femoral vein.  7. MRI brain w/ and w/o contrast done 3/20/19--a meningioma in the region of the right central sulcus has slightly increased since 18, stable small right posterior fossa meningioma.  8. CT soft tissue neck w/ contrast 3/6/19--no definite local or gerardo  recurrence.  9. US soft tissue neck 3/6/19--no definite local or gerardo recurrence, a left retroclavicular lymph node can be followed sonographically.  10. MRI brain at [facility] on 3/10/20--Stable right posterior fossa cerebellar pontine angle 1.1 cm meningioma. Stable right frontal parietal convexity 2cm meningioma.   11. CT soft tissue neck at [facility] on 3/10/20--Focal areas of enhancement along the left margin of the trachea which have demonstrated slow growth since 3/6/19 and are concerning for recurrent/residual cancer. Postoperative changes of total thyroidectomy, antrectomy, neck reconstruction and neck dissection.   12. CT chest w/o contrast at [facility] 9/9/20--enlarging nodule to left of tracheostomy concerning for metastatic disease, new prevascular adenopathy, clustered micronodules and focal ground glass in left lung most likely inflammatory, CAD.  13. CT neck w/ contrast on 9/9/20--complex surgical changes with resection of larynx pharynx, a flap reconstruction on permanent tracheostomy.  14. PET/CT at [facility] on 5/12/21--FDG avid left supraclavicular/peristomal nodules are suspicious for recurrence. Focal uptake in the left tracheoesophageal groove without clear CT correlate, is possibly recurrent disease. Focal uptake in the right neck is probably physiologic muscular versus vascular activity, attention on follow-up studies. Scattered pulmonary nodules are below the resolution of PET, continued attention on follow-up as clinically indicated. Otherwise, no evidence for distant metastatic disease.   15. US neck at [facility] on 5/13/21--Limited examination shows no lateral neck adenopathy.  16. CT chest on 5/13/21--Small nonspecific nodules are stable. No new pulmonary nodules or lymphadenopathy.   17. CT neck on 5/13/21--2 small nodules measuring 0.5 cm and 0.7 cm adjacent to the left side of the stoma which likely correspond to the site of metabolic activity on PET/CT. The hypermetabolic focus in the right supraclavicular  region does not correspond to definite nodularity, possible postoperative change associated with vascular structures. No lateral neck adenopathy. Stable 1.1 cm presumed meningioma in the right posterior fossa since April 2015.   18.CT neck w/ contrast on 12/1/21--Interval surgery and no evidence of recurrence in central compartment. No lateral neck adenopathy.   19. CT chest on 12/1/21--Nonspecific tiny pulmonary nodules are stable.   20. US head and neck on 12/1/21--No local recurrence. No lymphadenopathy.   21. PET/CT on 12/2/21 at [facility]--No evidence of FDG avid recurrent or metastatic thyroid carcinoma.   22. CT soft tissue neck/Chest done 11/30/22--no definite local recurrence in central compartments of neck, nonspecific lymph nodes posterior to the right inferior carotid sheath, stable, small nonspecific lung nodules are stable, no new nodules or adenopathy.      Treatment history:  1.Adjuvant left breast radiation completed 2013  2. Aromasin 12/13/2011--1/16/17 (completed 5 years treatment)  3. Radioactive iodine post-operatively - last dose on 2/23/16.   4. No post-operative radiation recommended due to high risk for tracheoinnominate fistula.  5. Eliquis 6/5/18--9/5/18 for bilateral LE DVT.     Current treatment: Observation.       Chief Complaint: Other Misc (Pt reports that she has acid reflux.)    HPI  Patient presents for follow-up of breast cancer the thyroid cancer. She continues to be followed by [facility] and scans were stable in 11/2022. She will return for repeat labs and imaging in 5/2023. Patient lost her  in March of 2022 so this [place] was difficult without him. They had been  for 63 years. She is suffering with acid reflux as well and is currently trying Zegerid. Her weight is actually up today.     Past Medical History:   Diagnosis Date    Breast cancer     DVT (deep venous thrombosis)     Hyperlipidemia     Mitral valve prolapse     Osteoarthritis of knee     Peripheral neuropathy        Review of patient's allergies indicates:  No Known Allergies   Current Outpatient Medications on File Prior to Visit   Medication Sig Dispense Refill    ALPRAZolam (XANAX) 0.25 MG tablet Take 0.125-0.25 mg by mouth 2 (two) times daily.      calcium carbonate (OS-INDU) 600 mg calcium (1,500 mg) Tab Take 600 mg by mouth.      famotidine (PEPCID) 40 mg/5 mL (8 mg/mL) suspension Take 20 mg by mouth once as needed.      fesoterodine (TOVIAZ) 4 mg Tb24 Toviaz 4 mg tablet,extended release      fexofenadine-pseudoephedrine  mg (ALLEGRA-D)  mg per tablet Take 1 tablet by mouth.      loperamide (IMODIUM) 2 mg capsule Take 2 mg by mouth.      loratadine-pseudoephedrine  mg (CLARITIN-D 24-HOUR)  mg per 24 hr tablet Take 1 tablet by mouth.      metoprolol succinate (TOPROL-XL) 50 MG 24 hr tablet Take 75 mg by mouth once daily.      mometasone (NASONEX) 50 mcg/actuation nasal spray mometasone 50 mcg/actuation nasal spray      omeprazole-sodium bicarbonate (ZEGERID) 40-1.1 mg-gram per capsule Take 1 capsule by mouth once as needed.      pilocarpine (SALAGEN) 5 MG Tab Take 5 mg by mouth 2 (two) times daily.      pregabalin (LYRICA) 50 MG capsule Take 2 capsules (100 mg total) by mouth 2 (two) times daily. 360 capsule 3    sucralfate (CARAFATE) 1 gram tablet Take 1 g by mouth 4 (four) times daily.      SYNTHROID 112 mcg tablet Take by mouth.      tolterodine (DETROL LA) 4 MG 24 hr capsule tolterodine ER 4 mg capsule,extended release 24 hr      albuterol (PROVENTIL) 2.5 mg /3 mL (0.083 %) nebulizer solution Inhale 2.5 mg into the lungs every 6 (six) hours as needed.      exemestane (AROMASIN) 25 mg tablet Take 25 mg by mouth once daily.      levoFLOXacin (LEVAQUIN) 500 MG tablet levofloxacin 500 mg tablet      meloxicam (MOBIC) 7.5 MG tablet meloxicam 7.5 mg tablet      metroNIDAZOLE (METROGEL) 0.75 % vaginal gel metronidazole 0.75 % vaginal gel      nitrofurantoin, macrocrystal-monohydrate, (MACROBID) 100  MG capsule Take 100 mg by mouth 2 (two) times daily.       No current facility-administered medications on file prior to visit.      Review of Systems   Constitutional:  Negative for appetite change, fatigue, fever and unexpected weight change.   HENT:  Negative for mouth sores.    Eyes: Negative.    Respiratory:  Negative for cough and shortness of breath.    Cardiovascular:  Negative for chest pain and leg swelling.   Gastrointestinal:  Positive for reflux. Negative for abdominal distention, abdominal pain, constipation, diarrhea, nausea and vomiting.   Genitourinary:  Negative for difficulty urinating, dysuria and hematuria.   Musculoskeletal:  Negative for arthralgias and back pain.   Integumentary:  Negative for rash.   Neurological:  Negative for weakness and headaches.   Hematological:  Negative for adenopathy.   Psychiatric/Behavioral:  Negative for sleep disturbance. The patient is not nervous/anxious.        Wt Readings from Last 3 Encounters:   01/24/23 0906 55.2 kg (121 lb 11.2 oz)   07/05/22 0857 53.7 kg (118 lb 6.4 oz)   01/04/21 1014 54.9 kg (121 lb 0.5 oz)   09/01/20 0935 54.6 kg (120 lb 5.9 oz)   12/04/18 1104 54.7 kg (120 lb 9.5 oz)   06/15/18 0919 58.9 kg (129 lb 13.6 oz)   04/12/18 1530 58.9 kg (129 lb 13.6 oz)        Physical Exam  Constitutional:       Appearance: Normal appearance.      Comments: Thin elderly white female   HENT:      Head: Normocephalic.      Nose: Nose normal.      Mouth/Throat:      Mouth: Mucous membranes are moist.   Eyes:      Extraocular Movements: Extraocular movements intact.      Conjunctiva/sclera: Conjunctivae normal.   Neck:      Comments: Tracheostomy in place with speaking valve  Cardiovascular:      Rate and Rhythm: Normal rate and regular rhythm.   Pulmonary:      Effort: Pulmonary effort is normal.      Breath sounds: Normal breath sounds.   Chest:      Comments: Scar right chest from flap surgery for thyroid, left breast with lumpectomy scar well-healed, no  masses in either breast and no axillary adenopathy.   Abdominal:      General: Bowel sounds are normal. There is no distension.      Palpations: Abdomen is soft.      Tenderness: There is no abdominal tenderness.   Musculoskeletal:         General: Normal range of motion.   Skin:     General: Skin is warm.   Neurological:      General: No focal deficit present.      Mental Status: She is alert and oriented to person, place, and time.   Psychiatric:         Mood and Affect: Mood normal.         Judgment: Judgment normal.       Lab Visit on 01/20/2023   Component Date Value    Sodium Level 01/20/2023 137     Potassium Level 01/20/2023 4.0     Chloride 01/20/2023 103     Carbon Dioxide 01/20/2023 28     Glucose Level 01/20/2023 121 (H)     Blood Urea Nitrogen 01/20/2023 19.2     Creatinine 01/20/2023 0.87     Calcium Level Total 01/20/2023 9.2     Protein Total 01/20/2023 6.7     Albumin Level 01/20/2023 3.7     Globulin 01/20/2023 3.0     Albumin/Globulin Ratio 01/20/2023 1.2     Bilirubin Total 01/20/2023 0.7     Alkaline Phosphatase 01/20/2023 74     Alanine Aminotransferase 01/20/2023 23     Aspartate Aminotransfera* 01/20/2023 23     eGFR 01/20/2023 >60     WBC 01/20/2023 5.6     RBC 01/20/2023 4.43     Hgb 01/20/2023 12.6     Hct 01/20/2023 40.1     MCV 01/20/2023 90.5     MCH 01/20/2023 28.4     MCHC 01/20/2023 31.4 (L)     RDW 01/20/2023 13.7     Platelet 01/20/2023 211     MPV 01/20/2023 8.8     Neut % 01/20/2023 62.2     Lymph % 01/20/2023 26.1     Mono % 01/20/2023 9.0     Eos % 01/20/2023 1.8     Basophil % 01/20/2023 0.7     Lymph # 01/20/2023 1.45     Neut # 01/20/2023 3.46     Mono # 01/20/2023 0.50     Eos # 01/20/2023 0.10     Baso # 01/20/2023 0.04     IG# 01/20/2023 0.01     IG% 01/20/2023 0.2           Assessment:       1. Malignant neoplasm of upper-outer quadrant of left breast in female, estrogen receptor positive       Plan:       Patient with h/o left early stage breast cancer diagnosed in  2011, treated with lumpectomy and adjuvant radiation. Completed adjuvant Aromasin since 1/2017 (5 years).     Currently patient has no signs or symptoms to suggest recurrence of her breast cancer by labs or physical exam findings.  MMG done at [facility] 12/21/22 benign. Repeat recommended in 1 year--ordered today.  Recent labs all good.    Had recurrence of thyroid cancer s/p left neck dissection 6/5/18 and continues follow-up with [facility].  S/p bilateral femoral DVT post-surgical diagnosed 6/5/18 at [facility] and completed 3 months of treatment with Eliquis.  Paratracheal gerardo recurrence noted on scans from 3/2020 and 9/2020.   Patient s/p resection 10/27/20 done Dr. Steiner at [facility]. Nodule with recurrence of thyroid cancer but mediastinal nodes negative.   In 5/2021 she returned to [facility] and had local recurrence seen on imaging. Also had thyroglobulin level increasing. S/p revision right supraclavicular (level IV) and left peristomal (level VI) dissection by Dr. Steiner on 6/9/21 at [facility]. Pathology positive for papillary thyroid carcinoma.   Patient had CT soft tissue neck/chest at [facility] in 11/2022 stable with MANUEL.     Plans are to continue follow-up and return to [facility] in 5/2023 for repeat scans.               She is also established here with ENT Dr. Patrick Hart and Dr. Garnett.     She is also followed at [facility] by neurosurgery for meningioma. Last brain MRI from 5/2022 stable. Plans to repeat in 5/2024.     RTC 1 year for continued follow-up of breast cancer with labs.    She is now >10 years out from her breast cancer, will continue to follow annually due to her ongoing issues with thryoid cancer.     Patient was told to contact me with any problems before return to clinic.           Ritika Montanez MD

## 2023-01-24 ENCOUNTER — OFFICE VISIT (OUTPATIENT)
Dept: HEMATOLOGY/ONCOLOGY | Facility: CLINIC | Age: 88
End: 2023-01-24
Payer: MEDICARE

## 2023-01-24 VITALS
TEMPERATURE: 98 F | DIASTOLIC BLOOD PRESSURE: 64 MMHG | SYSTOLIC BLOOD PRESSURE: 106 MMHG | WEIGHT: 121.69 LBS | HEIGHT: 70 IN | RESPIRATION RATE: 14 BRPM | BODY MASS INDEX: 17.42 KG/M2 | OXYGEN SATURATION: 98 % | HEART RATE: 73 BPM

## 2023-01-24 DIAGNOSIS — Z12.31 BREAST CANCER SCREENING BY MAMMOGRAM: Primary | ICD-10-CM

## 2023-01-24 DIAGNOSIS — Z17.0 MALIGNANT NEOPLASM OF UPPER-OUTER QUADRANT OF LEFT BREAST IN FEMALE, ESTROGEN RECEPTOR POSITIVE: ICD-10-CM

## 2023-01-24 DIAGNOSIS — C50.412 MALIGNANT NEOPLASM OF UPPER-OUTER QUADRANT OF LEFT BREAST IN FEMALE, ESTROGEN RECEPTOR POSITIVE: ICD-10-CM

## 2023-01-24 PROCEDURE — 99214 OFFICE O/P EST MOD 30 MIN: CPT | Mod: S$PBB,,, | Performed by: INTERNAL MEDICINE

## 2023-01-24 PROCEDURE — 99999 PR PBB SHADOW E&M-EST. PATIENT-LVL V: ICD-10-PCS | Mod: PBBFAC,,, | Performed by: INTERNAL MEDICINE

## 2023-01-24 PROCEDURE — 99215 OFFICE O/P EST HI 40 MIN: CPT | Mod: PBBFAC | Performed by: INTERNAL MEDICINE

## 2023-01-24 PROCEDURE — 99214 PR OFFICE/OUTPT VISIT, EST, LEVL IV, 30-39 MIN: ICD-10-PCS | Mod: S$PBB,,, | Performed by: INTERNAL MEDICINE

## 2023-01-24 PROCEDURE — 99999 PR PBB SHADOW E&M-EST. PATIENT-LVL V: CPT | Mod: PBBFAC,,, | Performed by: INTERNAL MEDICINE

## 2023-02-06 ENCOUNTER — CLINICAL SUPPORT (OUTPATIENT)
Dept: REHABILITATION | Facility: HOSPITAL | Age: 88
End: 2023-02-06
Payer: MEDICARE

## 2023-02-06 DIAGNOSIS — Z90.02 H/O LARYNGECTOMY: Primary | ICD-10-CM

## 2023-02-06 PROCEDURE — L8509 TRACH-ESOPH VOICE PROS MD IN: HCPCS

## 2023-02-06 PROCEDURE — 92507 TX SP LANG VOICE COMM INDIV: CPT

## 2023-02-06 NOTE — PROGRESS NOTES
[facility]  Speech Therapy Treatment Note  Laryngectomy  Date:  2/6/2023     PATIENT IS A NECK-BREATHER - DO NOT ORALLY INTUBATE    Name: Raymundo Arechiga   MRN: [id number]   Therapy Diagnosis: s/p Total laryngectomy  Encounter Diagnosis   Name Primary?    H/O laryngectomy Yes     Mrs. Arechiga is an 87 y/o female, referred for ST evaluation and treatment by Dr. Hart due to history of thyroid cancer, post-laryngectomy, management of TEP. Patient with history of residual papillary thyroid carcinoma with extensive involvement of the tracheal wall; s/p total laryngectomy, tracheal resection of tracheal rings 1-4, bilateral revision level VI dissection, auto transplantation of bilateral inferior parathyroid glands, and right pectoralis muscle flap from right chest to central neck at [facility] . Surgical resection of 2 left nodes due to concern for recurrence 6-5-18 at [facility], per patient report.  No changes in previous brain lesions, determined to be benign.    Physician: Patrick Hart Jr., MD    Time In:  1005  Time Out:  1040  Total Billable Time: 35     Precautions: Standard  Subjective:   Pt reports: leaking for approximately 2 weeks.Pt recently hospitalized for pneumonia. Using plug to consume liquids at home.    Pain Scale:  0/10 on VAS currently.   Pain Location: na    SURGICAL HISTORY  Past Surgical History:   Procedure Laterality Date    BREAST LUMPECTOMY      FESS, WITH DACRYOCYSTORHINOSTOMY      HYSTERECTOMY      LARYNGECTOMY      thyroid cancer surgery      TONSILLECTOMY AND ADENOIDECTOMY          DYSPHAGIA:   No complaints of dysphagia    Objective:        UNTIMED  Procedure Min.   Speech- Language- Voice Therapy  35     Total Untimed Units: 35  Charges Billed/# of units: 1      Long Term Goals:  Current Progress:   1.  Patient will utilize alaryngeal communication via TEP to express needs and desires without respiratory compromise >90% of the time.  Progressing towards goal          Short Term Goals:  Current Progress:   1. Patient will demonstrate care and use of HME system for maximum pulmonary benefit >90% of the time. Progressing towards goal   2.  Patient will demonstrate adequate care and use of TEP to maintain TEP voicing >90% of the time.  Progressing towards goal   3.  Patient will demonstrate adequate occlusion and cleaning of TEP to maintain voicing >90% of the time.  Progressing towards goal     Patient Education/Response:   Education completed: yes Plan of Care, role of SLP in care and TEP and stomal care, SLP educated Ms. Arechiga on rationale behind having some room for the prothesis to move; it can prevent irritation. SLP instructed pt to notify SLP if the extra length starts to cause dysphagia. Pt expressed understanding and opted to keep the same length.    Barriers to Learning:  NA    Teaching Method: Verbal    Assessment:   Raymundo is progressing well towards her goals. Current goals remain appropriate. Goals to be updated as necessary.     TEP Assessment:    Initial Fitting: no    Re-fitting: no    TEP Initial Fitting Date: 05/26/2017    TEP Current Status:Mrs. Arechiga is currently wearing a 20FR, 10mm Provox Delarosa placed on 11/23/2022. Additionally, she wears Optiderm adhesive with push to talk HMEs.     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  She is currently utilizing esophageal speech via a voice prosthesis for  alaryngeal communication for functional communication with her family, friends, medical providers, and others to perform her daily life activities.  She requires the intermittent use of a laryngectomy tube at all times to keep the airway patent and prevent stomal stenosis. HMEs are required daily for pulmonary optimization     Medical necessity is demonstrated by the following IMPAIRMENTS:  Total laryngectomy  Barriers to Therapy: NA  Pt's spiritual, cultural and educational needs considered and pt agreeable to plan of care and goals.Yes    Additional Information:     Mrs. Arechiga and daughter entered session this date reported TEP leaking for approximately 2 weeks. Pt recently hospitalized at [facility] for pneumonia. SLP visited on 02/01/2023 for plug education. Unable to change TEP at [facility]. SLP assessing TEP and sizing remains adequate at this time. SLP visualized minimal increase in length; however no complaints of dysphagia. Pt and SLP opting to remain in 10mm at this time. SLP replacing without incident this date. SLP to follow up as warranted.     Plan:   Continue POC with focus on alaryngeal communication, TEP management and patient education.     Danisha Camacho MS, CCC-SLP  2/6/2023

## 2023-04-21 ENCOUNTER — TELEPHONE (OUTPATIENT)
Dept: NEUROLOGY | Facility: CLINIC | Age: 88
End: 2023-04-21
Payer: MEDICARE

## 2023-04-21 ENCOUNTER — CLINICAL SUPPORT (OUTPATIENT)
Dept: REHABILITATION | Facility: HOSPITAL | Age: 88
End: 2023-04-21
Payer: MEDICARE

## 2023-04-21 DIAGNOSIS — Z90.02 H/O LARYNGECTOMY: Primary | ICD-10-CM

## 2023-04-21 PROCEDURE — L8509 TRACH-ESOPH VOICE PROS MD IN: HCPCS

## 2023-04-21 PROCEDURE — 92507 TX SP LANG VOICE COMM INDIV: CPT

## 2023-04-21 NOTE — TELEPHONE ENCOUNTER
Spoke with patient's pharmacy, pharmacy stated the patient need a PA for her Lyrica 50 mg Rx. Please advise.

## 2023-04-21 NOTE — PROGRESS NOTES
[facility]  Speech Therapy Treatment Note  Laryngectomy  Date:  4/21/2023     PATIENT IS A NECK-BREATHER - DO NOT ORALLY INTUBATE    Name: Raymundo Arechiga   MRN: [id number]   Therapy Diagnosis: s/p Total laryngectomy  Encounter Diagnosis   Name Primary?    H/O laryngectomy Yes     Mrs. Arechiga is an 87 y/o female, referred for ST evaluation and treatment by Dr. Hart due to history of thyroid cancer, post-laryngectomy, management of TEP. 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 Surgical resection of 2 left nodes due to concern for recurrence 6-5-18 at [facility], per patient report.  No changes in previous brain lesions, determined to be benign.    Physician: Patrick Hart Jr., MD    Time In:  0900  Time Out:  0925  Total Billable Time: 25     Precautions: Standard  Subjective:   Pt reports: leaking for approximately 2 weeks.Pt recently hospitalized for pneumonia. Using plug to consume liquids at home.    Pain Scale:  0/10 on VAS currently.   Pain Location: na    SURGICAL HISTORY  Past Surgical History:   Procedure Laterality Date    BREAST LUMPECTOMY      FESS, WITH DACRYOCYSTORHINOSTOMY      HYSTERECTOMY      LARYNGECTOMY      thyroid cancer surgery      TONSILLECTOMY AND ADENOIDECTOMY          DYSPHAGIA:   No complaints of dysphagia    Objective:        UNTIMED  Procedure Min.   Speech- Language- Voice Therapy  25     Total Untimed Units: 25  Charges Billed/# of units: 1      Long Term Goals:  Current Progress:   1.  Patient will utilize alaryngeal communication via TEP to express needs and desires without respiratory compromise >90% of the time.  Progressing towards goal          Short Term Goals:  Current Progress:   1. Patient will demonstrate care and use of HME system for maximum pulmonary benefit >90% of the time. Progressing towards goal   2.  Patient will demonstrate adequate care and use of TEP to maintain TEP voicing >90% of the time.  Progressing towards goal   3.  Patient will demonstrate adequate occlusion and cleaning of TEP to maintain voicing >90% of the time.  Progressing towards goal     Patient Education/Response:   Education completed: yes Plan of Care, role of SLP in care and TEP and stomal care, SLP educated Ms. Arechgia on rationale behind having some room for the prothesis to move; it can prevent irritation. SLP instructed pt to notify SLP if the extra length starts to cause dysphagia. Pt expressed understanding and opted to keep the same length.    Barriers to Learning:  NA    Teaching Method: Verbal    Assessment:   Raymundo is progressing well towards her goals. Current goals remain appropriate. Goals to be updated as necessary.     TEP Assessment:    Initial Fitting: no    Re-fitting: no    TEP Initial Fitting Date: 05/26/2017    TEP Current Status:Mrs. Arechiga is currently wearing a 20FR, 10mm Provox Delarosa placed on 02/06/2023. Additionally, she wears Optiderm adhesive with push to talk HMEs.     TEP Patient Complaints: prosthesis leaking    TEP Accessories: Optiderm adhesive with push to talk HMEs    Stoma Size:  narrow     Lymphedema:  no    TEP Fitting/Re-sizing:     Removed current TEP: yes moderate biofilm noted on esophageal phalange of prosthesis    TEP Removed catheter : no     TEP Sizing:yes 10mm    Puncture Dilation: no     TEP Prosthesis Placed:yes 20FR, 10mm Provox Delarosa    TEP Voicing with Open Tract:no     TEP Voicing with Prosthesis:yes     TEP Leaking through Prothesis:no     TEP Leaking around Prosthesis:no       Mrs. 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  She is currently utilizing esophageal speech via a voice prosthesis for  alaryngeal communication for functional communication with her family, friends, medical providers, and others to perform her daily life activities.  She requires the intermittent use of a laryngectomy tube at all times to keep the airway patent and prevent stomal stenosis. HMEs are required daily for pulmonary optimization     Medical necessity is demonstrated by the following IMPAIRMENTS:  Total laryngectomy  Barriers to Therapy: NA  Pt's spiritual, cultural and educational needs considered and pt agreeable to plan of care and goals.Yes    Additional Information:     Mrs. Arechiga entered session this date reported TEP leaking for approximately 3 weeks. SLP assessing TEP and sizing remains adequate at this time. SLP visualized minimal increase in length; however no complaints of dysphagia. Pt and SLP opting to remain in 10mm at this time. SLP replacing without incident this date. SLP to follow up as warranted.     Plan:   Continue POC with focus on alaryngeal communication, TEP management and patient education.     Danisha Camacho MS, CCC-SLP  4/21/2023

## 2023-06-01 ENCOUNTER — CLINICAL SUPPORT (OUTPATIENT)
Dept: REHABILITATION | Facility: HOSPITAL | Age: 88
End: 2023-06-01
Payer: MEDICARE

## 2023-06-01 DIAGNOSIS — Z90.02 H/O LARYNGECTOMY: Primary | ICD-10-CM

## 2023-06-01 PROCEDURE — 92507 TX SP LANG VOICE COMM INDIV: CPT

## 2023-06-01 PROCEDURE — L8509 TRACH-ESOPH VOICE PROS MD IN: HCPCS

## 2023-06-01 NOTE — PROGRESS NOTES
[facility]  Speech Therapy Treatment Note  Laryngectomy  Date:  6/1/2023     PATIENT IS A NECK-BREATHER - DO NOT ORALLY INTUBATE    Name: Raymundo Arechiga   MRN: [id number]   Therapy Diagnosis: s/p Total laryngectomy  Encounter Diagnosis   Name Primary?    H/O laryngectomy Yes     Mrs. Arechiga is an 87 y/o female, referred for ST evaluation and treatment by Dr. Hart due to history of thyroid cancer, post-laryngectomy, management of TEP. 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 Surgical resection of 2 left nodes due to concern for recurrence 6-5-18 at [facility], per patient report.  No changes in previous brain lesions, determined to be benign.    Physician: AUSTIN Hart    Time In:  0800  Time Out:  0825  Total Billable Time: 25     Precautions: Standard  Subjective:   Pt reports: leaking for approximately 1 week. Patient with upcoming appointment at [facility] for CA surveillance.     Pain Scale:  0/10 on VAS currently.   Pain Location: na    SURGICAL HISTORY  Past Surgical History:   Procedure Laterality Date    BREAST LUMPECTOMY      FESS, WITH DACRYOCYSTORHINOSTOMY      HYSTERECTOMY      LARYNGECTOMY      thyroid cancer surgery      TONSILLECTOMY AND ADENOIDECTOMY          DYSPHAGIA:   No complaints of dysphagia    Objective:        UNTIMED  Procedure Min.   Speech- Language- Voice Therapy  25     Total Untimed Units: 25  Charges Billed/# of units: 1      Long Term Goals:  Current Progress:   1.  Patient will utilize alaryngeal communication via TEP to express needs and desires without respiratory compromise >90% of the time.  Progressing towards goal         Short Term Goals:   Current Progress:   1. Patient will demonstrate care and use of HME system for maximum pulmonary benefit >90% of the time. Progressing towards goal   2.  Patient will demonstrate adequate care and use of TEP to maintain TEP voicing >90% of the time.  Progressing towards goal   3.  Patient will demonstrate adequate occlusion and cleaning of TEP to maintain voicing >90% of the time.  Progressing towards goal     Patient Education/Response:   Education completed: yes Plan of Care, role of SLP in care and TEP and stomal care, SLP educated Ms. Arechiga on rationale behind having some room for the prothesis to move; it can prevent irritation. SLP instructed pt to notify SLP if the extra length starts to cause dysphagia. Pt expressed understanding and opted to keep the same length.    Barriers to Learning:  NA    Teaching Method: Verbal    Assessment:   Raymundo is progressing well towards her goals. Current goals remain appropriate. Goals to be updated as necessary.     TEP Assessment:    Initial Fitting: no    Re-fitting: no    TEP Initial Fitting Date: 05/26/2017    TEP Current Status:Mrs. Arechiga is currently wearing a 20FR, 10mm Provox Delarosa placed on 04/21/2023. Additionally, she wears Optiderm adhesive with push to talk HMEs.     TEP Patient Complaints: prosthesis leaking    TEP Accessories: Optiderm adhesive with push to talk HMEs    Stoma Size:  narrow, odd shaped     Lymphedema:  no    TEP Fitting/Re-sizing:     Removed current TEP: yes mild biofilm noted on esophageal phalange of prosthesis    TEP Removed catheter : no     TEP Sizing:yes 10mm    Puncture Dilation: no     TEP Prosthesis Placed:yes 20FR, 10mm Provox Delarosa    TEP Voicing with Open Tract:no     TEP Voicing with Prosthesis:yes     TEP Leaking through Prothesis:no     TEP Leaking around Prosthesis:no       Mrs. 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  She is currently utilizing esophageal speech via a voice prosthesis for alaryngeal  communication for functional communication with her family, friends, medical providers, and others to perform her daily life activities.  She requires the intermittent use of a laryngectomy tube at all times to keep the airway patent and prevent stomal stenosis. HMEs are required daily for pulmonary optimization     Medical necessity is demonstrated by the following IMPAIRMENTS:  Total laryngectomy  Barriers to Therapy: NA  Pt's spiritual, cultural and educational needs considered and pt agreeable to plan of care and goals.Yes    Additional Information:     Mrs. Arechiga entered session this date reported TEP leaking for approximately 1 week. SLP assessing TEP and sizing remains adequate at this time. SLP visualized minimal increase in length; however no complaints of dysphagia. Pt and SLP opting to remain in 10mm at this time. SLP replacing without incident this date. Pt reported follow up appointment at [facility] schedule for 06/05/2023 for CA surveillance. SLP to follow up as warranted.     Plan:   Continue POC with focus on alaryngeal communication, TEP management and patient education.     Danisha Camacho MS, CCC-SLP  6/1/2023

## 2023-07-10 ENCOUNTER — OFFICE VISIT (OUTPATIENT)
Dept: NEUROLOGY | Facility: CLINIC | Age: 88
End: 2023-07-10
Payer: MEDICARE

## 2023-07-10 VITALS
BODY MASS INDEX: 17.48 KG/M2 | DIASTOLIC BLOOD PRESSURE: 52 MMHG | WEIGHT: 118 LBS | HEIGHT: 69 IN | HEART RATE: 80 BPM | SYSTOLIC BLOOD PRESSURE: 100 MMHG

## 2023-07-10 DIAGNOSIS — R79.9 ABNORMAL FINDING OF BLOOD CHEMISTRY, UNSPECIFIED: ICD-10-CM

## 2023-07-10 DIAGNOSIS — G62.9 NEUROPATHY: Primary | ICD-10-CM

## 2023-07-10 PROCEDURE — 99214 PR OFFICE/OUTPT VISIT, EST, LEVL IV, 30-39 MIN: ICD-10-PCS | Mod: S$PBB,,, | Performed by: PSYCHIATRY & NEUROLOGY

## 2023-07-10 PROCEDURE — 99213 OFFICE O/P EST LOW 20 MIN: CPT | Mod: PBBFAC | Performed by: PSYCHIATRY & NEUROLOGY

## 2023-07-10 PROCEDURE — 99214 OFFICE O/P EST MOD 30 MIN: CPT | Mod: S$PBB,,, | Performed by: PSYCHIATRY & NEUROLOGY

## 2023-07-10 PROCEDURE — 99999 PR PBB SHADOW E&M-EST. PATIENT-LVL III: CPT | Mod: PBBFAC,,, | Performed by: PSYCHIATRY & NEUROLOGY

## 2023-07-10 PROCEDURE — 99999 PR PBB SHADOW E&M-EST. PATIENT-LVL III: ICD-10-PCS | Mod: PBBFAC,,, | Performed by: PSYCHIATRY & NEUROLOGY

## 2023-07-10 RX ORDER — ASPIRIN 81 MG/1
81 TABLET ORAL DAILY
COMMUNITY

## 2023-07-10 NOTE — PROGRESS NOTES
Chief Complaint   Patient presents with    Peripheral Neuropathy     Pt states tingling to balls of feet still present currently lyrica 100 mg BID states difficulty with sleep pattern possibly getting 2-3 hours of continues sleep a night         This is a 87 y.o. female here for follow up for neuropathy. Recall history of thyroid cancer diagnosed 2015 status thyroidectomy, breast cancer diagnosed 2011 status post lumpectomy, history of DVT, history of meningioma. MRI 3/10/2020 showed stable right posterior fossa CPA 1.1 cm meningioma, right frontal parietal convexity 2 cm meningioma, followed by NSGY.    Lyrica helping 100 BID.     Was recently diagnosed with hiatal hernia and underwent a cardiac angiogram which was clear.  Notices that when she is eating she often has to regurgitate her food as she becomes uncomfortable when it gets to a certain level, wonders if this could be due to hiatal hernia and is following up with Gastroenterology for this.      Medication List with Changes/Refills   Current Medications    ALBUTEROL (PROVENTIL) 2.5 MG /3 ML (0.083 %) NEBULIZER SOLUTION    Inhale 2.5 mg into the lungs every 6 (six) hours as needed.    ALPRAZOLAM (XANAX) 0.25 MG TABLET    Take 0.125-0.25 mg by mouth 2 (two) times daily as needed.    ASPIRIN (ECOTRIN) 81 MG EC TABLET    Take 81 mg by mouth once daily.    CALCIUM CARBONATE (OS-INDU) 600 MG CALCIUM (1,500 MG) TAB    Take 600 mg by mouth.    EXEMESTANE (AROMASIN) 25 MG TABLET    Take 25 mg by mouth once daily.    FAMOTIDINE (PEPCID) 40 MG/5 ML (8 MG/ML) SUSPENSION    Take 20 mg by mouth once as needed.    FESOTERODINE (TOVIAZ) 4 MG TB24    Toviaz 4 mg tablet,extended release    FEXOFENADINE-PSEUDOEPHEDRINE  MG (ALLEGRA-D)  MG PER TABLET    Take 1 tablet by mouth.    LEVOFLOXACIN (LEVAQUIN) 500 MG TABLET    levofloxacin 500 mg tablet    LOPERAMIDE (IMODIUM) 2 MG CAPSULE    Take 2 mg by mouth once as needed.    LORATADINE-PSEUDOEPHEDRINE  MG  (CLARITIN-D 24-HOUR)  MG PER 24 HR TABLET    Take 1 tablet by mouth.    MELOXICAM (MOBIC) 7.5 MG TABLET    Take 7.5 mg by mouth once daily.    METOPROLOL SUCCINATE (TOPROL-XL) 50 MG 24 HR TABLET    Take 75 mg by mouth once daily.    MOMETASONE (NASONEX) 50 MCG/ACTUATION NASAL SPRAY    mometasone 50 mcg/actuation nasal spray    OMEPRAZOLE-SODIUM BICARBONATE (ZEGERID) 40-1.1 MG-GRAM PER CAPSULE    Take 1 capsule by mouth once as needed.    PILOCARPINE (SALAGEN) 5 MG TAB    Take 5 mg by mouth 2 (two) times daily.    PREGABALIN (LYRICA) 50 MG CAPSULE    TAKE 2 CAPSULES (100 MG TOTAL) BY MOUTH 2 (TWO) TIMES DAILY.    SUCRALFATE (CARAFATE) 1 GRAM TABLET    Take 1 g by mouth 4 (four) times daily.    SYNTHROID 112 MCG TABLET    Take 112 mcg by mouth before breakfast.    TOLTERODINE (DETROL LA) 4 MG 24 HR CAPSULE    tolterodine ER 4 mg capsule,extended release 24 hr   Discontinued Medications    METRONIDAZOLE (METROGEL) 0.75 % VAGINAL GEL    metronidazole 0.75 % vaginal gel    NITROFURANTOIN, MACROCRYSTAL-MONOHYDRATE, (MACROBID) 100 MG CAPSULE    Take 100 mg by mouth 2 (two) times daily.        Vitals:    07/10/23 0937   BP: (!) 100/52   Pulse: 80        General: alert and oriented, no acute distress, no audible wheezes, pulse intact, no edema    Cognition and Comprehension  Speech and language intact  Follows commands  Speech fluent  Attention intact  Memory for recent events intact from history taking  Affect pleasant  Fund of knowledge adequate    Cranial nerves  II. Optic: Visual fields full to confrontation both eyes  III, IV, VI. Oculomotor: Intact, Pupils equal, round and reactive to light, no nystagmus  V. Trigeminal: sensation to light touch normal  VII. No facial asymmetry or facial weakness  VIII. Hearing intact to spoken voice  IX/X. Glossopharyngeal/Vagus: Voice normal, palate rises symmetrically  XI. Axillary: Shoulder shrug normal  XII. Hypoglossal: Intact    Muscle Strength and Tone  Normal upper  extremity tone  Normal lower extremity tone  Normal upper extremity strength  Normal lower extremity strength    Sensation  Decreased sensation to vibration in a gradient distribution in BLE    Reflexes  hypo    Coordination and Gait  Finger to nose normal  Gait normal     1. Neuropathy  -     Immunofixation & Protein Electrophoresis & Immunoglobulins; Future; Expected date: 07/10/2023  -     Vitamin B12; Future; Expected date: 07/10/2023  -     Hemoglobin A1C; Future; Expected date: 07/10/2023    2. Abnormal finding of blood chemistry, unspecified  -     Hemoglobin A1C; Future; Expected date: 07/10/2023     Check labs for neuropathy  Cont Lyrica 100 BID  Following with [facility]GY for meningioma, denies any sx

## 2023-07-28 ENCOUNTER — TELEPHONE (OUTPATIENT)
Dept: NEUROLOGY | Facility: CLINIC | Age: 88
End: 2023-07-28
Payer: MEDICARE

## 2023-07-28 NOTE — TELEPHONE ENCOUNTER
Labs were normal apart from elevated A1C which can be an indicator of diabetes.  Please send results to her PCP who can address if needed

## 2023-07-28 NOTE — TELEPHONE ENCOUNTER
Patient called requesting a call back to further discuss if the office received her lab results that was completed on 07/10/2023. States she had them done at [facility] x 2 weeks ago. Please advise.

## 2023-08-02 DIAGNOSIS — R49.1 APHONIA: Primary | ICD-10-CM

## 2023-08-03 ENCOUNTER — CLINICAL SUPPORT (OUTPATIENT)
Dept: REHABILITATION | Facility: HOSPITAL | Age: 88
End: 2023-08-03
Attending: OTOLARYNGOLOGY
Payer: MEDICARE

## 2023-08-03 DIAGNOSIS — R49.1 APHONIA: ICD-10-CM

## 2023-08-03 DIAGNOSIS — Z90.02 H/O LARYNGECTOMY: Primary | ICD-10-CM

## 2023-08-03 PROCEDURE — L8509 TRACH-ESOPH VOICE PROS MD IN: HCPCS

## 2023-08-03 PROCEDURE — 92597 ORAL SPEECH DEVICE EVAL: CPT

## 2023-08-03 NOTE — PROGRESS NOTES
[facility]  Speech Therapy Evaluation   Laryngectomy  Date: 8/3/2023     Name: Raymundo Arechiga   MRN: [id number]    Therapy Diagnosis:   Encounter Diagnoses   Name Primary?    Aphonia     H/O laryngectomy Yes      Physician: Patrick Hart Jr., MD    PATIENT IS A NECK-BREATHER - DO NOT ORALLY INTUBATE    Time In:  0900   Time Out:  0950    Procedure Min.   Prosthesis Evaluation  50   Total Untimed Units: 50  Charges Billed/# of units: 50/1    Precautions: Standard  Subjective    Chief Complaint: prosthesis leaking for 1 1/2 weeks     AFFECT normal affect    Pain:   0/10  Pain Location / Description: NA  Current Medical History: Raymundo Arechiga is a 87 y.o. female referred by Dr. Hart for TEP management to maximize alaryngeal communication without respiratory compromise.    Past Medical History:   Mrs. Arechiga is an 88 y/o female, referred for ST evaluation and treatment by Dr. Hart due to history of thyroid cancer, post-laryngectomy, management of TEP. 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 Surgical resection of 2 left nodes due to concern for recurrence 6-5-18 at [facility], per patient report.  No changes in previous brain lesions, determined to be benign.  Past Medical History:   Diagnosis Date    Breast cancer     DVT (deep venous thrombosis)     Hyperlipidemia     Mitral valve prolapse     Osteoarthritis of knee     Peripheral neuropathy     Raymundo Arechiga  has a past surgical history that includes fess, with dacryocystorhinostomy; Hysterectomy; Laryngectomy; Breast lumpectomy; thyroid cancer surgery; and Tonsillectomy and adenoidectomy.  Medical Hx and Allergies: Raymundo has  a current medication list which includes the following prescription(s): albuterol, alprazolam, aspirin, calcium carbonate, exemestane, famotidine, toviaz, fexofenadine-pseudoephedrine  mg, levofloxacin, loperamide, loratadine-pseudoephedrine  mg, meloxicam, metoprolol succinate, mometasone, omeprazole-sodium bicarbonate, pilocarpine, pregabalin, sucralfate, synthroid, and tolterodine. Review of patient's allergies indicates:  No Known Allergies    Current Voice Function: alaryngeal communication via voice prothesis    Current Level of Swallow Function/Complaints:  no complaints of dysphagia  Prior Therapy:  06/01/2023    TEP ASSESSMENT   Initial Fitting: no    Re-fitting:yes    TEP Initial Fitting Date: 05/26/2017    TEP Current Status: Mrs. Arechiga is currently wearing a 20FR, 10mm Provox Delarosa. Additionally, she wears Optiderm Sensitive Adhesive with push to talk HMEs and occasionally wears her guido tube.     TEP Patient Complaints: prosthesis leaking    TEP Accessories: Optiderm Sensitive Adhesive with push to talk HMEs    Stoma Size:  oblong and slightly narrow     Lymphedema:  no    TEP Fitting/Re-sizing:     Removed current TEP: yes moderate biofilm noted esophageal phalange    TEP Removed catheter : no     TEP Sizing:yes 10mm    Puncture Dilation: no     TEP Prosthesis Placed:yes 20FR, 10mm Provox Delarosa    TEP Voicing with Open Tract:no     TEP Voicing with Prosthesis:yes good voicing noted after placement    TEP Leaking through Prothesis:no     TEP Leaking around Prosthesis:no       Education   Education: Plan of Care, role of SLP in care, and TEP and stomal care  were discussed with pt. Patient expressed understanding.     Assessment       LongTerm Goals:  Current Progress:   1.  Patient will utilize alaryngeal communication via TEP to express needs and desires without respiratory compromise >90% of the time.  Progressing towards goal       Short Term Goals:  Current Progress:   1. Patient will  "demonstrate care and use of HME system for maximum pulmonary benefit >90% of the time. Progressing towards goal   2.  Patient will demonstrate adequate care and use of TEP to maintain TEP voicing >90% of the time.  Progressing towards goal   3.  Patient will demonstrate adequate occlusion and cleaning of TEP to maintain voicing >90% of the time.  Progressing towards goal   4. Patient will increase laryngectomy tube usage daily or PRN to maintain and increase stomal patency.  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  She is currently utilizing esophageal speech via a voice prosthesis for alaryngeal communication for functional communication with her family, friends, medical providers, and others to perform her daily life activities.  She requires the intermittent use of a laryngectomy tube at all times to keep the airway patent and prevent stomal stenosis. HMEs are required daily for pulmonary optimization      Plan     SLP intervention is warranted 1 time a month or PRN for communication needs for a minimum of 1 year due to the chronic nature of the impairment.     Additional Information     Mrs. Arechiga entered and reported prothesis leaking for approximately 1 1/2 weeks. SLP assessed TEP and noted adequate size at this time. TEP changed without incident this date. Pt reported "blood tinged mucus" daily for approximately 2 weeks which ceased this week. She reported close contact with Pulmonologist for monitoring. She also reported management for possible TMJ with Dr. Hart. SLP to follow up at this time.     Danisha Camacho MS, CCC-SLP  8/3/2023          "

## 2023-09-15 ENCOUNTER — CLINICAL SUPPORT (OUTPATIENT)
Dept: REHABILITATION | Facility: HOSPITAL | Age: 88
End: 2023-09-15
Payer: MEDICARE

## 2023-09-15 DIAGNOSIS — Z90.02 H/O LARYNGECTOMY: Primary | ICD-10-CM

## 2023-09-15 PROCEDURE — 92507 TX SP LANG VOICE COMM INDIV: CPT

## 2023-09-15 PROCEDURE — L8509 TRACH-ESOPH VOICE PROS MD IN: HCPCS

## 2023-09-15 NOTE — PROGRESS NOTES
[facility]  Speech Therapy Evaluation   Laryngectomy  Date: 9/15/2023     Name: Raymundo Arechiga   MRN: [id number]    Therapy Diagnosis:   Encounter Diagnosis   Name Primary?    H/O laryngectomy Yes      Physician: No ref. provider found    PATIENT IS A NECK-BREATHER - DO NOT ORALLY INTUBATE    Time In:  0900   Time Out:  0925    Procedure Min.   Prosthesis Evaluation  25   Total Untimed Units: 25  Charges Billed/# of units: 25/1    Precautions: Standard  Subjective    Chief Complaint: prosthesis leaking for approximately 1 week     AFFECT normal affect    Pain:   0/10  Pain Location / Description: NA  Current Medical History: Raymundo Arechiga is a 87 y.o. female referred by Dr. Hart for TEP management to maximize alaryngeal communication without respiratory compromise.    Past Medical History:   Mrs. Arechiga is an 86 y/o female, referred for ST evaluation and treatment by Dr. Hart due to history of thyroid cancer, post-laryngectomy, management of TEP. 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 Surgical resection of 2 left nodes due to concern for recurrence 6-5-18 at [facility], per patient report.  No changes in previous brain lesions, determined to be benign.    Past Medical History:   Diagnosis Date    Breast cancer     DVT (deep venous thrombosis)     Hyperlipidemia     Mitral valve prolapse     Osteoarthritis of knee     Peripheral neuropathy     Raymundo Arechiga  has a past surgical history that includes fess, with dacryocystorhinostomy; Hysterectomy; Laryngectomy; Breast lumpectomy; thyroid cancer surgery; and Tonsillectomy and adenoidectomy.  Medical Hx and Allergies: Raymundo has  a current medication list which includes the following prescription(s): albuterol, alprazolam, aspirin, calcium carbonate, exemestane, famotidine, toviaz, fexofenadine-pseudoephedrine  mg, levofloxacin, loperamide, loratadine-pseudoephedrine  mg, meloxicam, metoprolol succinate, mometasone, omeprazole-sodium bicarbonate, pilocarpine, pregabalin, sucralfate, synthroid, and tolterodine. Review of patient's allergies indicates:  No Known Allergies    Current Voice Function: alaryngeal communication via voice prothesis    Current Level of Swallow Function/Complaints:  no complaints of dysphagia  Prior Therapy:  08/03/2023    TEP ASSESSMENT   Initial Fitting: no    Re-fitting:yes    TEP Initial Fitting Date: 05/26/2017    TEP Current Status: Mrs. Arechiga is currently wearing a 20FR, 10mm Provox Delarosa. Additionally, she wears Optiderm Sensitive Adhesive with push to talk HMEs and occasionally wears her guido tube.     TEP Patient Complaints: prosthesis leaking    TEP Accessories: Optiderm Sensitive Adhesive with push to talk HMEs    Stoma Size:  oblong and slightly narrow     Lymphedema:  no    TEP Fitting/Re-sizing:     Removed current TEP: yes moderate biofilm noted on esophageal phalange upon removal    TEP Removed catheter : no     TEP Sizing:yes 10mm    Puncture Dilation: no     TEP Prosthesis Placed:yes 20FR, 10mm Provox Delarosa    TEP Voicing with Open Tract:no     TEP Voicing with Prosthesis:yes good voicing noted after placement    TEP Leaking through Prothesis:no     TEP Leaking around Prosthesis:no       Education   Education: Plan of Care, role of SLP in care, and TEP and stomal care  were discussed with pt. Patient expressed understanding.     Barriers to Learning: NA    Teaching Method: Verbal    Assessment       LongTerm Goals:  Current Progress:   1.  Patient will utilize alaryngeal communication via TEP to express needs and desires without respiratory compromise >90% of the time.  Progressing towards  "goal       Short Term Goals:  Current Progress:   1. Patient will demonstrate care and use of HME system for maximum pulmonary benefit >90% of the time. Progressing towards goal   2.  Patient will demonstrate adequate care and use of TEP to maintain TEP voicing >90% of the time.  Progressing towards goal   3.  Patient will demonstrate adequate occlusion and cleaning of TEP to maintain voicing >90% of the time.  Progressing towards goal   4. Patient will increase laryngectomy tube usage daily or PRN to maintain and increase stomal patency.  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  She is currently utilizing esophageal speech via a voice prosthesis for alaryngeal communication for functional communication with her family, friends, medical providers, and others to perform her daily life activities.  She requires the intermittent use of a laryngectomy tube at all times to keep the airway patent and prevent stomal stenosis. HMEs are required daily for pulmonary optimization      Plan     SLP intervention is warranted 1 time a month or PRN for communication needs for a minimum of 1 year due to the chronic nature of the impairment.     Additional Information     Mrs. Arechiga entered and reported prothesis leaking for approximately 1 week. She stated she is scheduled for a follow up for "nodules" at [facility] on 09/18/2023. SLP assessed TEP and noted adequate size at this time. TEP changed without incident this date. Moderate biofilm noted on esophageal phalange upon removal, no change in medications reported. Mrs. Arechiga reported continued management for possible TMJ with Dr. Hart. SLP to follow up as warranted.  this time.     Danisha Camacho MS, CCC-SLP  9/15/2023          "

## 2023-10-29 DIAGNOSIS — G62.9 NEUROPATHY: ICD-10-CM

## 2023-10-30 RX ORDER — PREGABALIN 100 MG/1
100 CAPSULE ORAL 2 TIMES DAILY
Qty: 180 CAPSULE | Refills: 3 | Status: SHIPPED | OUTPATIENT
Start: 2023-10-30

## 2023-12-08 ENCOUNTER — CLINICAL SUPPORT (OUTPATIENT)
Dept: REHABILITATION | Facility: HOSPITAL | Age: 88
End: 2023-12-08
Payer: MEDICARE

## 2023-12-08 DIAGNOSIS — Z90.02 H/O LARYNGECTOMY: Primary | ICD-10-CM

## 2023-12-08 PROCEDURE — 92507 TX SP LANG VOICE COMM INDIV: CPT

## 2023-12-08 PROCEDURE — L8509 TRACH-ESOPH VOICE PROS MD IN: HCPCS

## 2023-12-08 NOTE — PROGRESS NOTES
[facility]  Speech Therapy Evaluation   Laryngectomy  Date: 12/8/2023     Name: Raymundo Arechiga   MRN: [id number]    Therapy Diagnosis:   Encounter Diagnosis   Name Primary?    H/O laryngectomy Yes      Physician: Patrick Hart Jr., MD    PATIENT IS A NECK-BREATHER - DO NOT ORALLY INTUBATE    Time In:  0900   Time Out:  0925    Procedure Min.   Prosthesis Evaluation  25   Total Untimed Units: 25  Charges Billed/# of units: 25/1    Precautions: Standard  Subjective    Chief Complaint: prosthesis leaking off and on for approximately 1 week     AFFECT normal affect    Pain:   0/10  Pain Location / Description: NA  Current Medical History: Raymundo Arechiga is a 88 y.o. female referred by Dr. Hart for TEP management to maximize alaryngeal communication without respiratory compromise.    Past Medical History:   Mrs. Arechiga is an 86 y/o female, referred for ST evaluation and treatment by Dr. Hart due to history of thyroid cancer, post-laryngectomy, management of TEP. 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 Surgical resection of 2 left nodes due to concern for recurrence 6-5-18 at [facility], per patient report.  No changes in previous brain lesions, determined to be benign.    Past Medical History:   Diagnosis Date    Breast cancer     DVT (deep venous thrombosis)     Hyperlipidemia     Mitral valve prolapse     Osteoarthritis of knee     Peripheral neuropathy     Raymundo Arechiga  has a past surgical history that includes fess, with dacryocystorhinostomy; Hysterectomy; Laryngectomy; Breast lumpectomy; thyroid cancer surgery; and Tonsillectomy and adenoidectomy.  Medical Hx and  Allergies: Raymundo has a current medication list which includes the following prescription(s): albuterol, alprazolam, aspirin, calcium carbonate, exemestane, famotidine, toviaz, fexofenadine-pseudoephedrine  mg, levofloxacin, loperamide, loratadine-pseudoephedrine  mg, meloxicam, metoprolol succinate, mometasone, omeprazole-sodium bicarbonate, pilocarpine, pregabalin, sucralfate, synthroid, and tolterodine. Review of patient's allergies indicates:  No Known Allergies    Current Voice Function: alaryngeal communication via voice prothesis    Current Level of Swallow Function/Complaints:  no complaints of dysphagia  Prior Therapy:  09/14/2023    TEP ASSESSMENT   Initial Fitting: no    Re-fitting:yes    TEP Initial Fitting Date: 05/26/2017    TEP Current Status: Mrs. Arechiga is currently wearing a 20FR, 10mm Provox Delarosa placed on 09/14/2023. Additionally, she wears Optiderm Sensitive Adhesive with push to talk HMEs and occasionally wears her guido tube.     TEP Patient Complaints: prosthesis leaking    TEP Accessories: Optiderm Sensitive Adhesive with push to talk HMEs    Stoma Size:  oblong and slightly narrow     Lymphedema:  no    TEP Fitting/Re-sizing:     Removed current TEP: yes moderate biofilm noted on esophageal phalange upon removal    TEP Removed catheter : no     TEP Sizing:yes 10mm    Puncture Dilation: no     TEP Prosthesis Placed:yes 20FR, 10mm Provox Delarosa    TEP Voicing with Open Tract:no     TEP Voicing with Prosthesis:yes good voicing noted after placement    TEP Leaking through Prothesis:no     TEP Leaking around Prosthesis:no       Education   Education: Plan of Care, role of SLP in care, and TEP and stomal care  were discussed with pt. Patient expressed understanding.     Barriers to Learning: NA    Teaching Method: Verbal    Assessment       LongTerm Goals:  Current Progress:   1.  Patient will utilize alaryngeal communication via TEP to express needs and desires without respiratory  "compromise >90% of the time.  Progressing towards goal       Short Term Goals:  Current Progress:   1. Patient will demonstrate care and use of HME system for maximum pulmonary benefit >90% of the time. Progressing towards goal   2.  Patient will demonstrate adequate care and use of TEP to maintain TEP voicing >90% of the time.  Progressing towards goal   3.  Patient will demonstrate adequate occlusion and cleaning of TEP to maintain voicing >90% of the time.  Progressing towards goal   4. Patient will increase laryngectomy tube usage daily or PRN to maintain and increase stomal patency.  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  She is currently utilizing esophageal speech via a voice prosthesis for alaryngeal communication for functional communication with her family, friends, medical providers, and others to perform her daily life activities.  She requires the intermittent use of a laryngectomy tube at all times to keep the airway patent and prevent stomal stenosis. HMEs are required daily for pulmonary optimization      Plan     SLP intervention is warranted 1 time a month or PRN for communication needs for a minimum of 1 year due to the chronic nature of the impairment.     Additional Information     Mrs. Arechiga entered and reported prothesis leaking off and on for approximately 1 week. Pt reported she was concerned that prothesis was "coming out". SLP assessed and noted that tracheal phalange was malformed. Moderate biofilm visible on shaft and tracheal phalange. Excessive biofilm also observed upon removal in shaft and on esophageal phalange. Prosthesis size remains adequate at this time. TEP changed without incident this date. Mrs. Arechiga reported antibiotic usage during the time of recent prothesis. SLP suspects as cause for increased biofilm growth. SLP to follow up as warranted.    Danisha Camacho MS, CCC-SLP  12/8/2023          "

## 2024-01-19 PROBLEM — D32.0 INTRACRANIAL MENINGIOMA: Status: ACTIVE | Noted: 2019-09-10

## 2024-01-19 PROBLEM — I82.409 DEEP VENOUS THROMBOSIS: Status: ACTIVE | Noted: 2024-01-19

## 2024-01-19 PROBLEM — I25.10 ARTERIOSCLEROSIS OF CORONARY ARTERY: Status: ACTIVE | Noted: 2023-05-12

## 2024-01-19 PROBLEM — I34.1 MITRAL VALVE PROLAPSE: Status: ACTIVE | Noted: 2024-01-19

## 2024-01-19 PROBLEM — K21.9 GASTRO-ESOPHAGEAL REFLUX DISEASE WITHOUT ESOPHAGITIS: Status: ACTIVE | Noted: 2024-01-19

## 2024-01-19 PROBLEM — M17.11 OSTEOARTHRITIS OF RIGHT KNEE: Status: ACTIVE | Noted: 2021-06-11

## 2024-01-19 PROBLEM — E78.5 HYPERLIPIDEMIA: Status: ACTIVE | Noted: 2024-01-19

## 2024-01-19 PROBLEM — M94.20 CHONDROMALACIA: Status: ACTIVE | Noted: 2024-01-19

## 2024-01-19 PROBLEM — Z86.0100 PERSONAL HISTORY OF COLONIC POLYPS: Status: ACTIVE | Noted: 2024-01-19

## 2024-01-19 PROBLEM — Z99.81 DEPENDENCE ON SUPPLEMENTAL OXYGEN: Status: ACTIVE | Noted: 2024-01-19

## 2024-01-19 PROBLEM — G62.9 PERIPHERAL NEUROPATHY: Status: ACTIVE | Noted: 2022-05-11

## 2024-01-19 PROBLEM — K31.89 OTHER DISEASES OF STOMACH AND DUODENUM: Status: ACTIVE | Noted: 2018-12-21

## 2024-01-19 PROBLEM — J39.8: Status: ACTIVE | Noted: 2022-05-11

## 2024-01-19 PROBLEM — Z90.02: Status: ACTIVE | Noted: 2017-01-11

## 2024-01-19 PROBLEM — E03.9 HYPOTHYROID: Status: ACTIVE | Noted: 2023-01-25

## 2024-01-19 PROBLEM — Z86.010 PERSONAL HISTORY OF COLONIC POLYPS: Status: ACTIVE | Noted: 2024-01-19

## 2024-01-19 PROBLEM — M81.0 OSTEOPOROSIS: Status: ACTIVE | Noted: 2024-01-19

## 2024-01-19 PROBLEM — K20.90 ESOPHAGITIS: Status: ACTIVE | Noted: 2018-12-21

## 2024-01-19 PROBLEM — E11.65 TYPE 2 DIABETES MELLITUS WITH HYPERGLYCEMIA: Status: ACTIVE | Noted: 2024-01-19

## 2024-01-19 PROBLEM — R19.2: Status: ACTIVE | Noted: 2019-01-03

## 2024-01-19 PROBLEM — M19.90 OSTEOARTHRITIS: Status: ACTIVE | Noted: 2024-01-19

## 2024-01-22 ENCOUNTER — LAB VISIT (OUTPATIENT)
Dept: LAB | Facility: HOSPITAL | Age: 89
End: 2024-01-22
Attending: NURSE PRACTITIONER
Payer: MEDICARE

## 2024-01-22 DIAGNOSIS — Z17.0 MALIGNANT NEOPLASM OF UPPER-OUTER QUADRANT OF LEFT BREAST IN FEMALE, ESTROGEN RECEPTOR POSITIVE: ICD-10-CM

## 2024-01-22 DIAGNOSIS — C50.412 MALIGNANT NEOPLASM OF UPPER-OUTER QUADRANT OF LEFT BREAST IN FEMALE, ESTROGEN RECEPTOR POSITIVE: ICD-10-CM

## 2024-01-22 LAB
ALBUMIN SERPL-MCNC: 3.5 G/DL (ref 3.4–4.8)
ALBUMIN/GLOB SERPL: 1.2 RATIO (ref 1.1–2)
ALP SERPL-CCNC: 60 UNIT/L (ref 40–150)
ALT SERPL-CCNC: 37 UNIT/L (ref 0–55)
AST SERPL-CCNC: 30 UNIT/L (ref 5–34)
BASOPHILS # BLD AUTO: 0.04 X10(3)/MCL
BASOPHILS NFR BLD AUTO: 0.8 %
BILIRUB SERPL-MCNC: 0.5 MG/DL
BUN SERPL-MCNC: 21.8 MG/DL (ref 9.8–20.1)
CALCIUM SERPL-MCNC: 8.7 MG/DL (ref 8.4–10.2)
CHLORIDE SERPL-SCNC: 107 MMOL/L (ref 98–107)
CO2 SERPL-SCNC: 29 MMOL/L (ref 23–31)
CREAT SERPL-MCNC: 0.87 MG/DL (ref 0.55–1.02)
EOSINOPHIL # BLD AUTO: 0.08 X10(3)/MCL (ref 0–0.9)
EOSINOPHIL NFR BLD AUTO: 1.7 %
ERYTHROCYTE [DISTWIDTH] IN BLOOD BY AUTOMATED COUNT: 14.6 % (ref 11.5–17)
GFR SERPLBLD CREATININE-BSD FMLA CKD-EPI: >60 MLS/MIN/1.73/M2
GLOBULIN SER-MCNC: 2.9 GM/DL (ref 2.4–3.5)
GLUCOSE SERPL-MCNC: 113 MG/DL (ref 82–115)
HCT VFR BLD AUTO: 38.2 % (ref 37–47)
HGB BLD-MCNC: 12.1 G/DL (ref 12–16)
IMM GRANULOCYTES # BLD AUTO: 0 X10(3)/MCL (ref 0–0.04)
IMM GRANULOCYTES NFR BLD AUTO: 0 %
LYMPHOCYTES # BLD AUTO: 1.12 X10(3)/MCL (ref 0.6–4.6)
LYMPHOCYTES NFR BLD AUTO: 23.3 %
MCH RBC QN AUTO: 28.7 PG (ref 27–31)
MCHC RBC AUTO-ENTMCNC: 31.7 G/DL (ref 33–36)
MCV RBC AUTO: 90.7 FL (ref 80–94)
MONOCYTES # BLD AUTO: 0.34 X10(3)/MCL (ref 0.1–1.3)
MONOCYTES NFR BLD AUTO: 7.1 %
NEUTROPHILS # BLD AUTO: 3.22 X10(3)/MCL (ref 2.1–9.2)
NEUTROPHILS NFR BLD AUTO: 67.1 %
PLATELET # BLD AUTO: 205 X10(3)/MCL (ref 130–400)
PMV BLD AUTO: 9 FL (ref 7.4–10.4)
POTASSIUM SERPL-SCNC: 4.2 MMOL/L (ref 3.5–5.1)
PROT SERPL-MCNC: 6.4 GM/DL (ref 5.8–7.6)
RBC # BLD AUTO: 4.21 X10(6)/MCL (ref 4.2–5.4)
SODIUM SERPL-SCNC: 140 MMOL/L (ref 136–145)
WBC # SPEC AUTO: 4.8 X10(3)/MCL (ref 4.5–11.5)

## 2024-01-22 PROCEDURE — 36415 COLL VENOUS BLD VENIPUNCTURE: CPT

## 2024-01-22 PROCEDURE — 80053 COMPREHEN METABOLIC PANEL: CPT

## 2024-01-22 PROCEDURE — 85025 COMPLETE CBC W/AUTO DIFF WBC: CPT

## 2024-01-25 ENCOUNTER — OFFICE VISIT (OUTPATIENT)
Dept: HEMATOLOGY/ONCOLOGY | Facility: CLINIC | Age: 89
End: 2024-01-25
Payer: MEDICARE

## 2024-01-25 VITALS
HEART RATE: 74 BPM | TEMPERATURE: 98 F | WEIGHT: 119 LBS | OXYGEN SATURATION: 99 % | DIASTOLIC BLOOD PRESSURE: 61 MMHG | BODY MASS INDEX: 19.83 KG/M2 | HEIGHT: 65 IN | SYSTOLIC BLOOD PRESSURE: 109 MMHG | RESPIRATION RATE: 14 BRPM

## 2024-01-25 DIAGNOSIS — C50.412 MALIGNANT NEOPLASM OF UPPER-OUTER QUADRANT OF LEFT BREAST IN FEMALE, ESTROGEN RECEPTOR POSITIVE: Primary | ICD-10-CM

## 2024-01-25 DIAGNOSIS — Z12.31 ENCOUNTER FOR SCREENING MAMMOGRAM FOR BREAST CANCER: ICD-10-CM

## 2024-01-25 DIAGNOSIS — C73 THYROID CANCER: ICD-10-CM

## 2024-01-25 DIAGNOSIS — D32.0 INTRACRANIAL MENINGIOMA: ICD-10-CM

## 2024-01-25 DIAGNOSIS — Z17.0 MALIGNANT NEOPLASM OF UPPER-OUTER QUADRANT OF LEFT BREAST IN FEMALE, ESTROGEN RECEPTOR POSITIVE: Primary | ICD-10-CM

## 2024-01-25 PROCEDURE — 99213 OFFICE O/P EST LOW 20 MIN: CPT | Mod: S$PBB,,, | Performed by: NURSE PRACTITIONER

## 2024-01-25 PROCEDURE — 99999 PR PBB SHADOW E&M-EST. PATIENT-LVL V: CPT | Mod: PBBFAC,,, | Performed by: NURSE PRACTITIONER

## 2024-01-25 PROCEDURE — 99215 OFFICE O/P EST HI 40 MIN: CPT | Mod: PBBFAC | Performed by: NURSE PRACTITIONER

## 2024-01-25 RX ORDER — TRIAMCINOLONE ACETONIDE 55 UG/1
2 SPRAY, METERED NASAL
COMMUNITY

## 2024-01-25 RX ORDER — LEVOTHYROXINE SODIUM 88 UG/1
88 TABLET ORAL
COMMUNITY

## 2024-01-25 RX ORDER — OXYBUTYNIN CHLORIDE 10 MG/1
10 TABLET, EXTENDED RELEASE ORAL
COMMUNITY

## 2024-01-25 RX ORDER — PROPRANOLOL HYDROCHLORIDE 160 MG/1
160 CAPSULE, EXTENDED RELEASE ORAL
COMMUNITY

## 2024-01-25 RX ORDER — ONDANSETRON 8 MG/1
8 TABLET, ORALLY DISINTEGRATING ORAL
COMMUNITY
Start: 2023-09-11

## 2024-01-25 NOTE — PROGRESS NOTES
Subjective:       Patient ID: Raymundo Arechiga is a 88 y.o. female.  Radiation oncologist: Dr. Chris Caldera  Primary Care: Dr. Svetlana Gramajo  Surgeon: Dr. Jan Roca  Endocrinologist: Dr. Kalen Garnett  Pulmonologist: Dr. Chandler Regan     1. Left breast cancer stage IA ([id number])--diagnosed 10/31/2011       Biopsy/histology: US guided biopsy left breast lesion done 10/31/2011--IDCA, grade 2, ER 86%, AL 82%, Her2 1+IHC.       Surgery/histology: Left  lumpectomy 11/21/2011, IDCA, low grade, 0.7 cm, 0 of 3 nodes, margins negative.                              DEXA:  9/30/14 - Osteopenia L2-4 T score of -1.22, AP spine -1.21, Left hip -1.11, L fem neck -2.12.   5/30/17 - Osteopenia L2-L4 T score of -1.32, Left hip -1.73, Left fem neck -2.41.     2. Recurrence Left Paratracheal 10/27/20  Recurrence Left Neck Nodules 6/2018  Thyroid Cancer diagnosed 7/14/15      Surgery/histology:   1. 7/2015--Right thyroid and isthmus--papillary thyroid carcinoma, well differentiated, classical type, 7.8cm, invades thyroid gland capsule, indeterminate lymphovascular invasion; Left thyroid lobectomy-nodular hyperplasia.  2. 11/8/16--[facility] S/p Total laryngectomy with tracheal resection of tracheal rings 1-4, bilateral revision level VI dissection, autotransplantation of bilateral inferior parathyroid glands in bilateral SCM and right pectoralis muscle flap. Pathology positive for papillary thyroid carcinoma, dominant follicular variant, involving laryngeal cartilage (2.5 cm in greatest dimension). Carcinoma was present in right first tracheal interspace, skeletal muscle, first 3 tracheal rings. 0:9 lymph nodes negative for tumor.   3. s/p Secondary TEP surgery done by Dr. Patrick Hart 5/26/17.  4. Left neck dissection and removal of nodules done 6/5/18 at [facility]--multifocal papillary thyroid carcinoma involving fibrous tissue and focal skeletal muscle.  5. Revision left and midline level VI and level VII neck dissection, multifocal left  paratracheal soft tissue disease grossly excised, midline superior mediastinal nodes removed done 10/27/20 at [facility]--pathology showed papillary thyroid carcinoma in fibrous tissue and muscle 2cm, may represent bed recurrence vs. gross replacement and extranodal extension of lymph node, superior mediastinal lymph nodes 6 negative for tumor.  6. Revision left central and right Level IV dissection on 21--Papillary thyroid carcinoma in fibrous tissue x 5 deposits measuring 0.6 cm, 0.6 cm, 0.7 cm, 0.8 cm and 0.8 cm. BRAF-mutated.     3. Meningiomas by MRI 2018     4. Bilateral Femoral DVT's 18--treated with 3 months Eliquis     Imagin. CT chest done at [facility] 11/2/15--LLL superior segment nodule/nodularity appears slightly more prominent than before measures 12X7mm, recommend close observation, stable precarinal lymph node, upper limits of normal.  2. CT chest 16--pulmonary nodule w/n LLL has diminished in volume.  3. CT chest 3/2/17--unchanged DEBORAH 4mm nodule.  4. PET/CT 18 [facility]--2 FDG-avid nodule left central compartment at total thyroidectomy bed, suggestive of metastatic disease, 2 additional small foci of mildly FDG-avid uptake in left neck, not specific but metastatic disease cannot be exluded, 2cm rounded lesion right frontal cortex without uptake, could represent metastasis vs. meningioma.  5. MRI brain w/ and w/o contrast [facility] 18--extra-axial mass overlying high right posterior frontal convexity c/w meningioma, thickened dural-based lesion seen overlying right petrous bone also c/w meningioma.  6. Venous doppler bilateral LE [facility] 18--occlusive DVT distal left femoral vein, near occlusive DVT distal right femoral vein.  7. MRI brain w/ and w/o contrast done 3/20/19--a meningioma in the region of the right central sulcus has slightly increased since 18, stable small right posterior fossa meningioma.  8. CT soft tissue neck w/ contrast 3/6/19--no definite local or gerardo  recurrence.  9. US soft tissue neck 3/6/19--no definite local or gerardo recurrence, a left retroclavicular lymph node can be followed sonographically.  10. MRI brain at [facility] on 3/10/20--Stable right posterior fossa cerebellar pontine angle 1.1 cm meningioma. Stable right frontal parietal convexity 2cm meningioma.   11. CT soft tissue neck at [facility] on 3/10/20--Focal areas of enhancement along the left margin of the trachea which have demonstrated slow growth since 3/6/19 and are concerning for recurrent/residual cancer. Postoperative changes of total thyroidectomy, antrectomy, neck reconstruction and neck dissection.   12. CT chest w/o contrast at [facility] 9/9/20--enlarging nodule to left of tracheostomy concerning for metastatic disease, new prevascular adenopathy, clustered micronodules and focal ground glass in left lung most likely inflammatory, CAD.  13. CT neck w/ contrast on 9/9/20--complex surgical changes with resection of larynx pharynx, a flap reconstruction on permanent tracheostomy.  14. PET/CT at [facility] on 5/12/21--FDG avid left supraclavicular/peristomal nodules are suspicious for recurrence. Focal uptake in the left tracheoesophageal groove without clear CT correlate, is possibly recurrent disease. Focal uptake in the right neck is probably physiologic muscular versus vascular activity, attention on follow-up studies. Scattered pulmonary nodules are below the resolution of PET, continued attention on follow-up as clinically indicated. Otherwise, no evidence for distant metastatic disease.   15. US neck at [facility] on 5/13/21--Limited examination shows no lateral neck adenopathy.  16. CT chest on 5/13/21--Small nonspecific nodules are stable. No new pulmonary nodules or lymphadenopathy.   17. CT neck on 5/13/21--2 small nodules measuring 0.5 cm and 0.7 cm adjacent to the left side of the stoma which likely correspond to the site of metabolic activity on PET/CT. The hypermetabolic focus in the right supraclavicular  region does not correspond to definite nodularity, possible postoperative change associated with vascular structures. No lateral neck adenopathy. Stable 1.1 cm presumed meningioma in the right posterior fossa since April 2015.   18.CT neck w/ contrast on 12/1/21--Interval surgery and no evidence of recurrence in central compartment. No lateral neck adenopathy.   19. CT chest on 12/1/21--Nonspecific tiny pulmonary nodules are stable.   20. US head and neck on 12/1/21--No local recurrence. No lymphadenopathy.   21. PET/CT on 12/2/21 at [facility]--No evidence of FDG avid recurrent or metastatic thyroid carcinoma.   22. CT soft tissue neck/Chest done 11/30/22--no definite local recurrence in central compartments of neck, nonspecific lymph nodes posterior to the right inferior carotid sheath, stable, small nonspecific lung nodules are stable, no new nodules or adenopathy.   23. CT soft tissue neck/chest at [facility] on 9/20/23--Stable subtle enhancing 5 mm focus along the anterior left SCM at site of prior focal FDG avidity. The stability of this finding is reassuring, but close attention on follow-up will be required to exclude site of residual neoplastic disease. Otherwise, no sites concerning for progressive neoplastic disease within the head and neck, including no evidence of suspicious head and neck lymphadenopathy. Resolution of opacities identified as new on the prior study. There are new small groundglass nodules in the right upper lobe consistent with inflammatory infectious process.      Treatment history:  1.Adjuvant left breast radiation completed 2013  2. Aromasin 12/13/2011--1/16/17 (completed 5 years treatment)  3. Radioactive iodine post-operatively - last dose on 2/23/16.   4. No post-operative radiation recommended due to high risk for tracheoinnominate fistula.  5. Eliquis 6/5/18--9/5/18 for bilateral LE DVT.     Current treatment: Observation.       Chief Complaint: Other Misc (Pt reports no new concerns  today.)    HPI  Patient presents for follow-up of breast cancer the thyroid cancer. She continues to be followed by [facility] and scans were stable in 9/2023. She will return for repeat labs and imaging in March 2024. Recent screening MMG on 1/11/24 benign. She continues suffering with acid reflux but the Zegrid is helping. Her weight is stable today. Otherwise, doing well without any complaints today.     Past Medical History:   Diagnosis Date    Breast cancer     DVT (deep venous thrombosis)     Hyperlipidemia     Mitral valve prolapse     Osteoarthritis of knee     Peripheral neuropathy       Review of patient's allergies indicates:  No Known Allergies   Current Outpatient Medications on File Prior to Visit   Medication Sig Dispense Refill    albuterol (PROVENTIL) 2.5 mg /3 mL (0.083 %) nebulizer solution Inhale 2.5 mg into the lungs every 6 (six) hours as needed.      ALPRAZolam (XANAX) 0.25 MG tablet Take 0.125-0.25 mg by mouth 2 (two) times daily as needed.      aspirin (ECOTRIN) 81 MG EC tablet Take 81 mg by mouth once daily.      calcium carbonate (OS-INDU) 600 mg calcium (1,500 mg) Tab Take 600 mg by mouth.      famotidine (PEPCID) 40 mg/5 mL (8 mg/mL) suspension Take 20 mg by mouth once as needed.      fesoterodine (TOVIAZ) 4 mg Tb24 Toviaz 4 mg tablet,extended release      fexofenadine-pseudoephedrine  mg (ALLEGRA-D)  mg per tablet Take 1 tablet by mouth.      loperamide (IMODIUM) 2 mg capsule Take 2 mg by mouth once as needed.      loratadine-pseudoephedrine  mg (CLARITIN-D 24-HOUR)  mg per 24 hr tablet Take 1 tablet by mouth.      meloxicam (MOBIC) 7.5 MG tablet Take 7.5 mg by mouth once daily.      metoprolol succinate (TOPROL-XL) 50 MG 24 hr tablet Take 75 mg by mouth once daily.      mometasone (NASONEX) 50 mcg/actuation nasal spray mometasone 50 mcg/actuation nasal spray      omeprazole-sodium bicarbonate (ZEGERID) 40-1.1 mg-gram per capsule Take 1 capsule by mouth once as needed.       pilocarpine (SALAGEN) 5 MG Tab Take 5 mg by mouth 2 (two) times daily.      pregabalin (LYRICA) 100 MG capsule TAKE 1 CAPSULE BY MOUTH TWICE A  capsule 3    sucralfate (CARAFATE) 1 gram tablet Take 1 g by mouth 4 (four) times daily.      SYNTHROID 112 mcg tablet Take 112 mcg by mouth before breakfast.      tolterodine (DETROL LA) 4 MG 24 hr capsule tolterodine ER 4 mg capsule,extended release 24 hr      exemestane (AROMASIN) 25 mg tablet Take 25 mg by mouth once daily.      levoFLOXacin (LEVAQUIN) 500 MG tablet levofloxacin 500 mg tablet       No current facility-administered medications on file prior to visit.      Review of Systems   Constitutional:  Negative for appetite change, fatigue, fever and unexpected weight change.   HENT:  Negative for mouth sores.    Eyes: Negative.    Respiratory:  Negative for cough and shortness of breath.    Cardiovascular:  Negative for chest pain and leg swelling.   Gastrointestinal:  Positive for reflux. Negative for abdominal distention, abdominal pain, constipation, diarrhea, nausea and vomiting.   Genitourinary:  Negative for difficulty urinating, dysuria and hematuria.   Musculoskeletal:  Negative for arthralgias and back pain.   Integumentary:  Negative for rash.   Neurological:  Negative for weakness and headaches.   Hematological:  Negative for adenopathy.   Psychiatric/Behavioral:  Negative for sleep disturbance. The patient is not nervous/anxious.          Wt Readings from Last 3 Encounters:   01/25/24 0829 54 kg (119 lb)   07/10/23 0937 53.5 kg (118 lb)   01/24/23 0906 55.2 kg (121 lb 11.2 oz)        Physical Exam  Constitutional:       Appearance: Normal appearance.      Comments: Thin elderly white female   HENT:      Head: Normocephalic.      Nose: Nose normal.      Mouth/Throat:      Mouth: Mucous membranes are moist.   Eyes:      Extraocular Movements: Extraocular movements intact.      Conjunctiva/sclera: Conjunctivae normal.   Neck:      Comments:  Tracheostomy in place with speaking valve  Cardiovascular:      Rate and Rhythm: Normal rate and regular rhythm.   Pulmonary:      Effort: Pulmonary effort is normal.      Breath sounds: Normal breath sounds.      Comments: Faint crackles in bases  Chest:      Comments: Scar right chest from flap surgery for thyroid, left breast with lumpectomy scar well-healed, no masses in either breast and no axillary adenopathy.   Abdominal:      General: Abdomen is flat. Bowel sounds are normal. There is no distension.      Palpations: Abdomen is soft.      Tenderness: There is no abdominal tenderness.   Musculoskeletal:         General: Normal range of motion.   Skin:     General: Skin is warm.   Neurological:      General: No focal deficit present.      Mental Status: She is alert and oriented to person, place, and time.   Psychiatric:         Mood and Affect: Mood normal.         Judgment: Judgment normal.       Lab Visit on 01/22/2024   Component Date Value    Sodium Level 01/22/2024 140     Potassium Level 01/22/2024 4.2     Chloride 01/22/2024 107     Carbon Dioxide 01/22/2024 29     Glucose Level 01/22/2024 113     Blood Urea Nitrogen 01/22/2024 21.8 (H)     Creatinine 01/22/2024 0.87     Calcium Level Total 01/22/2024 8.7     Protein Total 01/22/2024 6.4     Albumin Level 01/22/2024 3.5     Globulin 01/22/2024 2.9     Albumin/Globulin Ratio 01/22/2024 1.2     Bilirubin Total 01/22/2024 0.5     Alkaline Phosphatase 01/22/2024 60     Alanine Aminotransferase 01/22/2024 37     Aspartate Aminotransfera* 01/22/2024 30     eGFR 01/22/2024 >60     WBC 01/22/2024 4.80     RBC 01/22/2024 4.21     Hgb 01/22/2024 12.1     Hct 01/22/2024 38.2     MCV 01/22/2024 90.7     MCH 01/22/2024 28.7     MCHC 01/22/2024 31.7 (L)     RDW 01/22/2024 14.6     Platelet 01/22/2024 205     MPV 01/22/2024 9.0     Neut % 01/22/2024 67.1     Lymph % 01/22/2024 23.3     Mono % 01/22/2024 7.1     Eos % 01/22/2024 1.7     Basophil % 01/22/2024 0.8      Lymph # 01/22/2024 1.12     Neut # 01/22/2024 3.22     Mono # 01/22/2024 0.34     Eos # 01/22/2024 0.08     Baso # 01/22/2024 0.04     IG# 01/22/2024 0.00     IG% 01/22/2024 0.0           Assessment:       1. Malignant neoplasm of upper-outer quadrant of left breast in female, estrogen receptor positive    2. Thyroid cancer    3. Intracranial meningioma       Plan:       Patient with h/o left early stage breast cancer diagnosed in 2011, treated with lumpectomy and adjuvant radiation. Completed adjuvant Aromasin since 1/2017 (5 years).     Currently patient has no signs or symptoms to suggest recurrence of her breast cancer by labs or physical exam findings.  MMG done at [facility] 12/21/22 benign. Repeat recommended in 1 year--ordered today.  Recent labs all good.    Had recurrence of thyroid cancer s/p left neck dissection 6/5/18 and continues follow-up with [facility].  S/p bilateral femoral DVT post-surgical diagnosed 6/5/18 at [facility] and completed 3 months of treatment with Eliquis.  Paratracheal gerardo recurrence noted on scans from 3/2020 and 9/2020.   Patient s/p resection 10/27/20 done Dr. Steiner at [facility]. Nodule with recurrence of thyroid cancer but mediastinal nodes negative.   In 5/2021 she returned to [facility] and had local recurrence seen on imaging. Also had thyroglobulin level increasing. S/p revision right supraclavicular (level IV) and left peristomal (level VI) dissection by Dr. Steiner on 6/9/21 at [facility]. Pathology positive for papillary thyroid carcinoma.     Patient had CT soft tissue neck/chest at [facility] in 9/2023 stable with MANUEL.   Recent labs all good.   Plans are to continue follow-up and return to [facility] in 3/2024 for repeat scans.   She is also followed at [facility] by neurosurgery for meningioma. Last brain MRI from 5/2022 stable. Plans to repeat in 3/2024.     She is now >11 years out from her breast cancer, will continue to follow annually due to her ongoing issues with thryoid cancer.  Will schedule screening MMG at [facility]  prior to appointment.   She is also established here with ENT Dr. Patrick Hart and Dr. Garnett.  RTC 1 year for continued follow-up of breast cancer with labs.     Patient was told to contact me with any problems before return to clinic.        PEGGY Cole

## 2024-01-29 DIAGNOSIS — R49.1 APHONIA: Primary | ICD-10-CM

## 2024-02-29 ENCOUNTER — CLINICAL SUPPORT (OUTPATIENT)
Dept: REHABILITATION | Facility: HOSPITAL | Age: 89
End: 2024-02-29
Payer: MEDICARE

## 2024-02-29 DIAGNOSIS — R49.1 APHONIA: ICD-10-CM

## 2024-02-29 DIAGNOSIS — Z90.02 H/O LARYNGECTOMY: Primary | ICD-10-CM

## 2024-02-29 PROCEDURE — L8509 TRACH-ESOPH VOICE PROS MD IN: HCPCS

## 2024-02-29 PROCEDURE — 92597 ORAL SPEECH DEVICE EVAL: CPT

## 2024-02-29 NOTE — PLAN OF CARE
[facility]  Speech Therapy Evaluation   Laryngectomy  Date: 02/29/2024      Name: Raymundo Arechiga   MRN: [id number]    Therapy Diagnosis:   Encounter Diagnosis   Name Primary?    H/O laryngectomy Yes      Physician: Patrick Hart Jr., MD    PATIENT IS A NECK-BREATHER - DO NOT ORALLY INTUBATE    Time In:  0900   Time Out: 0930    Procedure Min.   Prosthesis Evaluation  30   Total Untimed Units: 30  Charges Billed/# of units: 30/1    Precautions: Standard  Subjective    Chief Complaint: want to change prior to visit at [facility]    AFFECT normal affect    Pain:   0/10  Pain Location / Description: NA  Current Medical History: Raymundo Arechiga is a 88 y.o. female referred by Dr. Hart for TEP management to maximize alaryngeal communication without respiratory compromise.    Past Medical History:   Mrs. Arechiga is an 87 y/o female, referred for ST evaluation and treatment by Dr. Hart due to history of thyroid cancer, post-laryngectomy, management of TEP. Patient with history of residual papillary thyroid carcinoma with extensive involvement of the tracheal wall; s/p total laryngectomy, tracheal resection of tracheal rings 1-4, bilateral revision level VI dissection, auto transplantation of bilateral inferior parathyroid glands, and right pectoralis muscle flap from right chest to central neck at [facility] . Surgical resection of 2 left nodes due to concern for recurrence 6-5-18 at [facility], per patient report.  No changes in previous brain lesions, determined to be benign.  Past Medical History:   Diagnosis Date    Breast cancer     DVT (deep venous thrombosis)     Hyperlipidemia     Mitral valve prolapse     Osteoarthritis of knee     Peripheral neuropathy     Raymundo Arechiga  has a past surgical history that includes fess, with dacryocystorhinostomy; Hysterectomy; Laryngectomy; Breast lumpectomy; thyroid cancer surgery; and Tonsillectomy and adenoidectomy.    Medical Hx and Allergies: Raymundo has  a current medication list which includes the following prescription(s): albuterol, alprazolam, calcium carbonate, exemestane, famotidine, toviaz, fexofenadine-pseudoephedrine  mg, levofloxacin, loperamide, loratadine-pseudoephedrine  mg, meloxicam, metoprolol succinate, metronidazole, mometasone, nitrofurantoin (macrocrystal-monohydrate), omeprazole-sodium bicarbonate, pilocarpine, pregabalin, sucralfate, synthroid, and tolterodine. Review of patient's allergies indicates:  No Known Allergies    Current Voice Function: alaryngeal communication via voice prothesis    Current Level of Swallow Function/Complaints:  no complaints of dysphagia  Prior Therapy:  12/8/2023    TEP ASSESSMENT   Initial Fitting: no    Re-fitting:yes    TEP Initial Fitting Date: 05/26/2017    TEP Current Status: Mrs. Arechiga is currently wearing a 20FR, 10mm Provox Delarosa. Additionally, she wears Optiderm Sensitive Adhesive with push to talk HMEs and occasionally wears her guido tube.     TEP Patient Complaints:prosthesis change prior to [facility] follow up     TEP Accessories: Optiderm Sensitive Adhesive with push to talk HMEs    Stoma Size:  oblong and slightly narrow     Lymphedema:  no    TEP Fitting/Re-sizing:     Removed current TEP: yes moderate biofilm noted surface of prosthesis    TEP Removed catheter : no     TEP Sizing:yes 10mm    Puncture Dilation: no     TEP Prosthesis Placed:yes 20FR, 10mm Provox Delarosa    TEP Voicing with Open Tract:no     TEP Voicing with Prosthesis:yes good voicing noted after placement    TEP Leaking through Prothesis:no     TEP Leaking around Prosthesis:no       Education   Education: Plan of Care, role of SLP in care, and TEP and stomal care were discussed with pt. Patient expressed understanding.     Assessment       LongTerm Goals:  Current Progress:   1.  Patient will utilize alaryngeal communication via TEP to express needs and desires without respiratory compromise >90% of the time.  Progressing  towards goal       Short Term Goals:  Current Progress:   1. Patient will demonstrate care and use of HME system for maximum pulmonary benefit >90% of the time. Progressing towards goal   2.  Patient will demonstrate adequate care and use of TEP to maintain TEP voicing >90% of the time.  Progressing towards goal   3.  Patient will demonstrate adequate occlusion and cleaning of TEP to maintain voicing >90% of the time.  Progressing towards goal   4. Patient will increase laryngectomy tube usage daily or PRN to maintain and increase stomal patency.  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  She is currently utilizing esophageal speech via a voice prosthesis for alaryngeal communication for functional communication with her family, friends, medical providers, and others to perform her daily life activities.  She requires the intermittent use of a laryngectomy tube at all times to keep the airway patent and prevent stomal stenosis. HMEs are required daily for pulmonary optimization      Plan     SLP intervention is warranted 1 time a month or PRN for communication needs for a minimum of 1 year due to the chronic nature of the impairment.     Additional Information     Mrs. Arechiga entered and requested prothesis change due to upcoming [facility] follow up. SLP assessed prothesis and noted size adequate this time. Biofilm noted on all aspects of prosthesis. TEP changed without incident this date. No  leaking noted through or around prothesis after change. Good and improved voice noted after change. SLP to follow up at this time.     Danisha Camacho MS, CCC-SLP  02/29/2024

## 2024-05-09 ENCOUNTER — CLINICAL SUPPORT (OUTPATIENT)
Dept: REHABILITATION | Facility: HOSPITAL | Age: 89
End: 2024-05-09
Payer: MEDICARE

## 2024-05-09 DIAGNOSIS — Z90.02 H/O LARYNGECTOMY: Primary | ICD-10-CM

## 2024-05-09 PROCEDURE — 92507 TX SP LANG VOICE COMM INDIV: CPT

## 2024-05-09 PROCEDURE — L8509 TRACH-ESOPH VOICE PROS MD IN: HCPCS

## 2024-05-09 NOTE — PROGRESS NOTES
[facility]  Speech Therapy Treatment Note  Laryngectomy      PATIENT IS A NECK-BREATHER - DO NOT ORALLY INTUBATE    Name: Raymundo Arechiga   MRN: [id number]   Therapy Diagnosis:   Encounter Diagnosis   Name Primary?    H/O laryngectomy Yes     Physician: Patrick Hart Jr., MD    Date: 05/09/2024    Time In:  0810  Time Out:  0840  Total Billable Time: 30     Precautions: Standard  Subjective:   Pt reports: Intermittent leaking for a few days. Pending vacation to [place] for 10 days.      Pain Scale:  0/10 on VAS currently.   Pain Location: NA    Current Medical History: Raymundo Arechiga is a 88 y.o. female referred by Dr. Hart for TEP management to maximize alaryngeal communication without respiratory compromise.      Past Medical History:   Mrs. Arechiga is an 87 y/o female, referred for ST evaluation and treatment by Dr. Hart due to history of thyroid cancer, post-laryngectomy, management of TEP. 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 Surgical resection of 2 left nodes due to concern for recurrence 6-5-18 at [facility], per patient report.  No changes in previous brain lesions, determined to be benign.    SURGICAL HISTORY  Past Surgical History:   Procedure Laterality Date    BREAST LUMPECTOMY      FESS, WITH DACRYOCYSTORHINOSTOMY      HYSTERECTOMY      LARYNGECTOMY      thyroid cancer surgery      TONSILLECTOMY AND ADENOIDECTOMY          Current Level of Swallow Function/Complaints:  alaryngeal communication via voice prosthesis  Current Level of Communication Function/Complaints: intermittent leaking  Prior Therapy:  02/29/2024    Objective:        UNTIMED  Procedure Min.    Speech- Language- Voice Therapy  30     Total Untimed Units: 30  Charges Billed/# of units: 30/1      Long Term Goals:  Current Progress:   1.  Patient will utilize alaryngeal communication via TEP to express needs and desires without respiratory compromise >90% of the time.  Progressing towards goal         Short Term Goals:  Current Progress:   1. Patient will demonstrate care and use of HME system for maximum pulmonary benefit >90% of the time. Progressing towards goal   2.  Patient will demonstrate adequate care and use of TEP to maintain TEP voicing >90% of the time.  Progressing towards goal   3.  Patient will demonstrate adequate occlusion and cleaning of TEP to maintain voicing >90% of the time.  Progressing towards goal   4. Patient will increase laryngectomy tube usage daily or PRN to maintain and increase stomal patency.  Progressing towards goal     Patient Education/Response:   Education completed: Yes Plan of Care, scheduling/ cancellation policy, TEP management, stomal care, use of Provox plug in case of prosthesis leaking, and use of RRC in case of prosthesis dislodgement      Barriers to Learning:  NA    Teaching Method: Verbal, Written    Assessment:   Raymundo Arechiga is progressing well towards her goals. Current goals remain appropriate. Goals to be updated as necessary.     TEP Assessment:    Initial Fitting: No    Re-fitting:Yes    TEP Initial Fitting Date: 05/26/2017    TEP Current Status:Mrs. Arechiga is currently wearing a 20FR, 10mm Provox Delarosa placed on 02/29/2024. She also wears Optiderm adhesives with push to talk HMEs    TEP Patient Complaints:intermittent leaking, planned vacation to [place] for 10 days    TEP Accessories: Optiderm adhesives with push to talk HMEs    Stoma Size:  adequate yet slightly narrow     Lymphedema:  No    TEP Fitting/Re-sizing:     Removed current TEP: Yes moderate biofilm noted on all aspects of prosthesis, tracheal phalange noted to curled anteriorly    TEP Removed  catheter : No     TEP Sizing:Yes 10mm, slight increased length noted    Puncture Dilation: No     TEP Prosthesis Placed:Yes 20FR, 10mm Provox Delarosa    TEP Voicing with Open Tract:No     TEP Voicing with Prosthesis:Yes Good voicing noted immediately    TEP Leaking through Prothesis:No     TEP Leaking around Prosthesis:No     Mrs. 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  She is currently utilizing esophageal speech via a voice prosthesis for alaryngeal communication for functional communication with her family, friends, medical providers, and others to perform daily life activities.  Mrs. Arechiga requires the use of a laryngectomy tube at all times to keep the airway patent and prevent stomal stenosis. HMEs are required daily for pulmonary optimization     Medical necessity is demonstrated by the following IMPAIRMENTS:  Total laryngectomy  Barriers to Therapy: NA  Pt's spiritual, cultural and educational needs considered and pt agreeable to plan of care and goals.Yes    Additional Information:     Mrs. Arechiga entered and reported intermittent leaking with planned vacation to [place] for 10 days. SLP assessed prosthesis and observed tracheal phalange curled anteriorly resulting in increased length. Moderate biofilm noted on all aspects of prosthesis upon removal. Pt reported recent antibiotic usage for suspected UTI. SLP replaced 20FR, 10mm Provox Delarosa without incident. Good voicing noted immediately and no leaking visualized through or around prosthesis with thin liquids. Minimally increased length with new 10mm. SLP to re-assess length at next change and possibly downsize to 8mm. SLP to follow up as warranted.     Plan:   Continue POC with focus on alaryngeal communication, patient and family education.     Danisha Camacho MS, CCC-SLP    5/9/2024

## 2024-07-10 ENCOUNTER — OFFICE VISIT (OUTPATIENT)
Dept: NEUROLOGY | Facility: CLINIC | Age: 89
End: 2024-07-10
Payer: MEDICARE

## 2024-07-10 VITALS
DIASTOLIC BLOOD PRESSURE: 60 MMHG | HEART RATE: 65 BPM | HEIGHT: 69 IN | SYSTOLIC BLOOD PRESSURE: 105 MMHG | BODY MASS INDEX: 17.03 KG/M2 | WEIGHT: 115 LBS

## 2024-07-10 DIAGNOSIS — G62.9 PERIPHERAL POLYNEUROPATHY: Primary | ICD-10-CM

## 2024-07-10 DIAGNOSIS — E11.65 TYPE 2 DIABETES MELLITUS WITH HYPERGLYCEMIA, UNSPECIFIED WHETHER LONG TERM INSULIN USE: ICD-10-CM

## 2024-07-10 PROCEDURE — 99999 PR PBB SHADOW E&M-EST. PATIENT-LVL IV: CPT | Mod: PBBFAC,,, | Performed by: NURSE PRACTITIONER

## 2024-07-10 PROCEDURE — 99214 OFFICE O/P EST MOD 30 MIN: CPT | Mod: S$PBB,,, | Performed by: NURSE PRACTITIONER

## 2024-07-10 PROCEDURE — 99214 OFFICE O/P EST MOD 30 MIN: CPT | Mod: PBBFAC | Performed by: NURSE PRACTITIONER

## 2024-07-10 RX ORDER — PREGABALIN 100 MG/1
100 CAPSULE ORAL EVERY MORNING
Qty: 30 CAPSULE | Refills: 2 | Status: SHIPPED | OUTPATIENT
Start: 2024-07-10

## 2024-07-10 RX ORDER — PREGABALIN 150 MG/1
150 CAPSULE ORAL NIGHTLY
Qty: 30 CAPSULE | Refills: 2 | Status: SHIPPED | OUTPATIENT
Start: 2024-07-10

## 2024-07-10 NOTE — PROGRESS NOTES
Subjective:       Patient ID: Raymundo Arechiga is a 88 y.o. female.    Chief Complaint: Peripheral Neuropathy (Tingling to feet and legs has increase. Finds lyrica 100 mg bid is not helping as much. Is having some cramping to legs at night.)      History of Present Illness:   Follow-up visit for neuropathy. Recall history of thyroid cancer diagnosed 2015 status thyroidectomy, breast cancer diagnosed 2011 status post lumpectomy, history of DVT, history of meningioma.  MRI brain 2024 showed stable presumed meningiomas in the right frontoparietal convexity and right posterior fossa.    She feels her neuropathy pain has worsened since last visit.  It is most pronounced and painful at bedtime, but is also more pronounced during the day.  She is just busy during the day so she does not notice it as much.  She is wondering if an increase in Lyrica dose would help at all.  Recall, she has been on gabapentin in the past.  She estimates sometime in the 80s and it was not helpful.  She denies any weakness or falls.  Labs after last visit with an A1c of 6.5, B12 515, and normal [place]            Past Medical History:   Diagnosis Date    Breast cancer     DVT (deep venous thrombosis)     Hyperlipidemia     Mitral valve prolapse     Osteoarthritis of knee     Peripheral neuropathy        Past Surgical History:   Procedure Laterality Date    BREAST LUMPECTOMY      FESS, WITH DACRYOCYSTORHINOSTOMY      HYSTERECTOMY      LARYNGECTOMY      thyroid cancer surgery      TONSILLECTOMY AND ADENOIDECTOMY          Family History   Problem Relation Name Age of Onset    Hypertension Father      Heart attack Father      Heart attack Brother          Social History     Socioeconomic History    Marital status:    Tobacco Use    Smoking status: Never    Smokeless tobacco: Never   Substance and Sexual Activity    Alcohol use: Not Currently     Comment: 1-2x per year    Drug use: Never        Outpatient Encounter Medications as of 7/10/2024    Medication Sig Dispense Refill    albuterol (PROVENTIL) 2.5 mg /3 mL (0.083 %) nebulizer solution Inhale 2.5 mg into the lungs every 6 (six) hours as needed.      ALPRAZolam (XANAX) 0.25 MG tablet Take 0.125-0.25 mg by mouth 2 (two) times daily as needed.      aspirin (ECOTRIN) 81 MG EC tablet Take 81 mg by mouth once daily.      calcium carbonate (OS-INDU) 600 mg calcium (1,500 mg) Tab Take 600 mg by mouth once daily.      famotidine (PEPCID) 40 mg/5 mL (8 mg/mL) suspension Take 20 mg by mouth once as needed.      fesoterodine (TOVIAZ) 4 mg Tb24 Toviaz 4 mg tablet,extended release      fexofenadine-pseudoephedrine  mg (ALLEGRA-D)  mg per tablet Take 1 tablet by mouth as needed.      loperamide (IMODIUM) 2 mg capsule Take 2 mg by mouth once as needed.      loratadine-pseudoephedrine  mg (CLARITIN-D 24-HOUR)  mg per 24 hr tablet Take 1 tablet by mouth as needed.      medical supply, miscellaneous (O-2 SUSPENSORY MISC) 2 L by Misc.(Non-Drug; Combo Route) route every evening.      metoprolol succinate (TOPROL-XL) 50 MG 24 hr tablet Take 75 mg by mouth once daily.      mometasone (NASONEX) 50 mcg/actuation nasal spray 1 spray as needed.      omeprazole-sodium bicarbonate (ZEGERID) 40-1.1 mg-gram per capsule Take 1 capsule by mouth once as needed.      ondansetron (ZOFRAN-ODT) 8 MG TbDL Take 8 mg by mouth every 6 (six) hours as needed.      oxybutynin (DITROPAN-XL) 10 MG 24 hr tablet Take 10 mg by mouth.      pilocarpine (SALAGEN) 5 MG Tab Take 5 mg by mouth 2 (two) times daily.      sucralfate (CARAFATE) 1 gram tablet Take 1 g by mouth 4 (four) times daily.      SYNTHROID 88 mcg tablet Take 88 mcg by mouth.      tolterodine (DETROL LA) 4 MG 24 hr capsule tolterodine ER 4 mg capsule,extended release 24 hr      triamcinolone (NASACORT) 55 mcg nasal inhaler 2 sprays by Nasal route as needed.      [DISCONTINUED] pregabalin (LYRICA) 100 MG capsule TAKE 1 CAPSULE BY MOUTH TWICE A  capsule 3     "meloxicam (MOBIC) 7.5 MG tablet Take 7.5 mg by mouth once daily. (Patient not taking: Reported on 7/10/2024)      pregabalin (LYRICA) 100 MG capsule Take 1 capsule (100 mg total) by mouth every morning. 30 capsule 2    pregabalin (LYRICA) 150 MG capsule Take 1 capsule (150 mg total) by mouth every evening. 30 capsule 2    propranoloL (INDERAL LA) 160 mg 24 hr capsule 160 mg. (Patient not taking: Reported on 7/10/2024)       No facility-administered encounter medications on file as of 7/10/2024.      Objective:   /60 (BP Location: Right arm)   Pulse 65   Ht 5' 9" (1.753 m)   Wt 52.2 kg (115 lb)   BMI 16.98 kg/m²        Physical Exam  General: alert and oriented, no acute distress, no audible wheezes, pulse intact, no edema     Cognition and Comprehension  Speech and language intact  Follows commands     Cranial nerves  II. Optic: Visual fields full to confrontation both eyes  III, IV, VI. Oculomotor: Intact, Pupils equal, round and reactive to light, no nystagmus  V. Trigeminal: sensation to light touch normal  VII. No facial asymmetry or facial weakness  VIII. Hearing intact to spoken voice  IX/X. Glossopharyngeal/Vagus: Voice normal, palate rises symmetrically  XI. Axillary: Shoulder shrug normal  XII. Hypoglossal: Intact     Muscle Strength and Tone  Normal upper extremity tone  Normal lower extremity tone  Normal upper extremity strength  Normal lower extremity strength     Sensation  Decreased sensation to vibration in a gradient distribution in BLE     Reflexes  hypo     Coordination and Gait  Finger to nose normal  Gait normal       Assessment & Plan:      1. Peripheral polyneuropathy  Assessment & Plan:  - increase nighttime dose of Lyrica to 150 mg at bedtime and continue 100 mg in the morning.  If she would like to increase the morning dose further, she will call and let us know and we can send in for 150 mg b.I.d.    Orders:  -     pregabalin (LYRICA) 150 MG capsule; Take 1 capsule (150 mg total) " by mouth every evening.  Dispense: 30 capsule; Refill: 2  -     pregabalin (LYRICA) 100 MG capsule; Take 1 capsule (100 mg total) by mouth every morning.  Dispense: 30 capsule; Refill: 2    2. Type 2 diabetes mellitus with hyperglycemia, unspecified whether long term insulin use  Overview:  Formatting of this note might be different from the original.   borderline- no meds            This note was created with the assistance of voice recognition software. There may be transcription errors as a result of using this technology however minimal. Effort has been made to assure accuracy of transcription but any obvious errors or omissions should be clarified with the author of the document.

## 2024-07-10 NOTE — ASSESSMENT & PLAN NOTE
- increase nighttime dose of Lyrica to 150 mg at bedtime and continue 100 mg in the morning.  If she would like to increase the morning dose further, she will call and let us know and we can send in for 150 mg b.I.d.

## 2024-10-05 DIAGNOSIS — G62.9 PERIPHERAL POLYNEUROPATHY: ICD-10-CM

## 2024-10-07 RX ORDER — PREGABALIN 150 MG/1
150 CAPSULE ORAL NIGHTLY
Qty: 30 CAPSULE | Refills: 2 | Status: SHIPPED | OUTPATIENT
Start: 2024-10-07

## 2024-11-09 DIAGNOSIS — G62.9 PERIPHERAL POLYNEUROPATHY: ICD-10-CM

## 2024-11-11 RX ORDER — PREGABALIN 100 MG/1
100 CAPSULE ORAL 2 TIMES DAILY
Qty: 60 CAPSULE | Refills: 2 | Status: SHIPPED | OUTPATIENT
Start: 2024-11-11

## 2024-11-19 ENCOUNTER — CLINICAL SUPPORT (OUTPATIENT)
Dept: REHABILITATION | Facility: HOSPITAL | Age: 89
End: 2024-11-19
Payer: MEDICARE

## 2024-11-19 DIAGNOSIS — Z90.02 H/O LARYNGECTOMY: Primary | ICD-10-CM

## 2024-11-19 PROCEDURE — 92507 TX SP LANG VOICE COMM INDIV: CPT | Mod: KX

## 2024-11-19 PROCEDURE — L8509 TRACH-ESOPH VOICE PROS MD IN: HCPCS | Mod: KX

## 2024-11-19 NOTE — PROGRESS NOTES
[facility]  Speech Therapy Treatment Note  Laryngectomy      PATIENT IS A NECK-BREATHER - DO NOT ORALLY INTUBATE    Name: Raymundo Arechiga   MRN: [id number]   Therapy Diagnosis:   Encounter Diagnosis   Name Primary?    H/O laryngectomy Yes     Physician: No ref. provider found    Date: 11/19/2024    Time In:  0950  Time Out:  1005  Total Billable Time: 15     Precautions: Standard  Subjective:   Pt reports: Requesting to change due to traveling out of state    Pain Scale:  0/10 on VAS currently.   Pain Location: NA    Current Medical History: Raymundo Arechiga is a 88 y.o. female referred by Dr. Hart for TEP management to maximize alaryngeal communication without respiratory compromise.      Past Medical History:   Mrs. Arechiga is an 89 y/o female, referred for ST evaluation and treatment by Dr. Hart due to history of thyroid cancer, post-laryngectomy, management of TEP. 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 Surgical resection of 2 left nodes due to concern for recurrence 6-5-18 at [facility], per patient report.  No changes in previous brain lesions, determined to be benign.    SURGICAL HISTORY  Past Surgical History:   Procedure Laterality Date    BREAST LUMPECTOMY      FESS, WITH DACRYOCYSTORHINOSTOMY      HYSTERECTOMY      LARYNGECTOMY      thyroid cancer surgery      TONSILLECTOMY AND ADENOIDECTOMY          Current Level of Swallow Function/Complaints:  alaryngeal communication via voice prosthesis  Current Level of Communication Function/Complaints: intermittent leaking  Prior Therapy:  02/29/2024    Objective:        UNTIMED  Procedure Min.   Speech- Language- Voice  Therapy  15     Total Untimed Units: 15  Charges Billed/# of units: 15/1      Long Term Goals:  Current Progress:   1.  Patient will utilize alaryngeal communication via TEP to express needs and desires without respiratory compromise >90% of the time.  Progressing towards goal         Short Term Goals:  Current Progress:   1. Patient will demonstrate care and use of HME system for maximum pulmonary benefit >90% of the time. Progressing towards goal   2.  Patient will demonstrate adequate care and use of TEP to maintain TEP voicing >90% of the time.  Progressing towards goal   3.  Patient will demonstrate adequate occlusion and cleaning of TEP to maintain voicing >90% of the time.  Progressing towards goal   4. Patient will increase laryngectomy tube usage daily or PRN to maintain and increase stomal patency.  Progressing towards goal     Patient Education/Response:   Education completed: Yes Plan of Care, scheduling/ cancellation policy, TEP management, stomal care, use of Provox plug in case of prosthesis leaking, and use of RRC in case of prosthesis dislodgement      Barriers to Learning:  NA    Teaching Method: Verbal, Written    Assessment:   Raymundo Arechiga is progressing well towards her goals. Current goals remain appropriate. Goals to be updated as necessary.     TEP Assessment:    Initial Fitting: No    Re-fitting:Yes    TEP Initial Fitting Date: 05/26/2017    TEP Current Status:Mrs. Arechiga is currently wearing a 20FR, 10mm Provox Delarosa placed on 05/09/2024. She also wears Optiderm adhesives with push to talk HMEs    TEP Patient Complaints:Requesting to change due to traveling out of state    TEP Accessories: Optiderm adhesives with push to talk HMEs    Stoma Size:  adequate yet slightly narrow     Lymphedema:  No    TEP Fitting/Re-sizing:     Removed current TEP: Yes moderate biofilm noted on all aspects of prosthesis, tracheal phalange noted to curled anteriorly    TEP Removed catheter : No     TEP Sizing:Yes  10mm, slight increased length noted    Puncture Dilation: No     TEP Prosthesis Placed:Yes 20FR, 10mm Provox Delarosa    TEP Voicing with Open Tract:No     TEP Voicing with Prosthesis:Yes Good voicing noted immediately    TEP Leaking through Prothesis:No     TEP Leaking around Prosthesis:No     Mrs. 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  She is currently utilizing esophageal speech via a voice prosthesis for alaryngeal communication for functional communication with her family, friends, medical providers, and others to perform daily life activities.  Mrs. Arechiga requires the use of a laryngectomy tube at all times to keep the airway patent and prevent stomal stenosis. HMEs are required daily for pulmonary optimization     Medical necessity is demonstrated by the following IMPAIRMENTS:  Total laryngectomy  Barriers to Therapy: NA  Pt's spiritual, cultural and educational needs considered and pt agreeable to plan of care and goals.Yes    Additional Information:     Mrs. Arechiga entered and requested change due to traveling out of state. Decreased vocal loudness observed. Moderate biofilm noted on all aspects of prosthesis upon removal. SLP replaced 20FR, 10mm Provox Delarosa without incident. Good voicing noted immediately and no leaking visualized through or around prosthesis with thin liquids. SLP to follow up as warranted.     Plan:   Continue POC with focus on alaryngeal communication, patient and family education.     WERO Prasad, SSLP  Danisha Camacho MS, CCC-SLP    11/19/2024

## 2025-01-05 DIAGNOSIS — G62.9 PERIPHERAL POLYNEUROPATHY: ICD-10-CM

## 2025-01-06 RX ORDER — PREGABALIN 150 MG/1
150 CAPSULE ORAL
Qty: 30 CAPSULE | Refills: 2 | Status: SHIPPED | OUTPATIENT
Start: 2025-01-06

## 2025-01-14 ENCOUNTER — OFFICE VISIT (OUTPATIENT)
Dept: NEUROLOGY | Facility: CLINIC | Age: 89
End: 2025-01-14
Payer: MEDICARE

## 2025-01-14 VITALS
SYSTOLIC BLOOD PRESSURE: 120 MMHG | BODY MASS INDEX: 18.13 KG/M2 | HEART RATE: 70 BPM | WEIGHT: 122.38 LBS | HEIGHT: 69 IN | DIASTOLIC BLOOD PRESSURE: 60 MMHG

## 2025-01-14 DIAGNOSIS — G62.9 PERIPHERAL POLYNEUROPATHY: ICD-10-CM

## 2025-01-14 PROCEDURE — 99214 OFFICE O/P EST MOD 30 MIN: CPT | Mod: S$PBB,,, | Performed by: PSYCHIATRY & NEUROLOGY

## 2025-01-14 PROCEDURE — 99999 PR PBB SHADOW E&M-EST. PATIENT-LVL III: CPT | Mod: PBBFAC,,, | Performed by: PSYCHIATRY & NEUROLOGY

## 2025-01-14 PROCEDURE — 99213 OFFICE O/P EST LOW 20 MIN: CPT | Mod: PBBFAC | Performed by: PSYCHIATRY & NEUROLOGY

## 2025-01-14 RX ORDER — PREGABALIN 100 MG/1
100 CAPSULE ORAL EVERY MORNING
Qty: 90 CAPSULE | Refills: 3 | Status: SHIPPED | OUTPATIENT
Start: 2025-01-14

## 2025-01-14 RX ORDER — PREGABALIN 150 MG/1
150 CAPSULE ORAL NIGHTLY
Qty: 90 CAPSULE | Refills: 3 | Status: SHIPPED | OUTPATIENT
Start: 2025-01-14

## 2025-01-14 RX ORDER — DEXLANSOPRAZOLE 60 MG/1
60 CAPSULE, DELAYED RELEASE ORAL DAILY
COMMUNITY

## 2025-01-14 RX ORDER — VONOPRAZAN FUMARATE 13.36 MG/1
10 TABLET ORAL DAILY
COMMUNITY
Start: 2024-05-07

## 2025-01-14 NOTE — PROGRESS NOTES
Chief Complaint   Patient presents with    Peripheral Neuropathy     Pt states doing well with medication denies progression did have one episode about a week ago had a increase of tingling and pain to legs but states could possibly have missed morning dose of medication         This is a 89 y.o. female here for follow up for neuropathy. She feels her neuropathy pain has worsened since last visit. Has a flare up in December after holidays. Noticing tingling in hands. It is most pronounced and painful at bedtime, but is also more pronounced during the day. She is just busy during the day so she does not notice it as much. Lyrica increased to 100/150, some improvement.     MRI brain 3/24- Stable dural based enhancing mass in the right lateral posterior fossa measuring 1.3 x 1 x 1.1 cm (SI x AP by transverse, 19/43 and 17/124). No significant mass effect is seen on the underlying parenchyma.     Grossly stable enhancing dural based mass in the right lateral frontoparietal convexity which measures 2.5 x 2 x 2 cm on today's exam, previously 2.6 x 1.9 x 2.1 cm (SI x AP x transverse, 19/90 and 17/121). There is mild stable mass effect on the adjacent brain parenchyma without associated parenchymal edema.     X ray 12/24 spine- Fairly severe curvature of the thoracolumbar spine, convexity toward the left, degenerative changes      Medication List with Changes/Refills   Current Medications    ALBUTEROL (PROVENTIL) 2.5 MG /3 ML (0.083 %) NEBULIZER SOLUTION    Inhale 2.5 mg into the lungs every 6 (six) hours as needed.    ALPRAZOLAM (XANAX) 0.25 MG TABLET    Take 0.125-0.25 mg by mouth 2 (two) times daily as needed.    ASPIRIN (ECOTRIN) 81 MG EC TABLET    Take 81 mg by mouth once daily.    CALCIUM CARBONATE (OS-INDU) 600 MG CALCIUM (1,500 MG) TAB    Take 600 mg by mouth once daily.    DEXLANSOPRAZOLE (DEXILANT) 60 MG CAPSULE    Take 60 mg by mouth Daily.    FAMOTIDINE (PEPCID) 40 MG/5 ML (8 MG/ML) SUSPENSION    Take 20 mg by  mouth once as needed.    FESOTERODINE (TOVIAZ) 4 MG TB24    Toviaz 4 mg tablet,extended release    FEXOFENADINE-PSEUDOEPHEDRINE  MG (ALLEGRA-D)  MG PER TABLET    Take 1 tablet by mouth as needed.    LEVOTHYROXINE (SYNTHROID) 25 MCG TABLET    Take 25 mcg by mouth before breakfast.    LOPERAMIDE (IMODIUM) 2 MG CAPSULE    Take 2 mg by mouth once as needed.    LORATADINE-PSEUDOEPHEDRINE  MG (CLARITIN-D 24-HOUR)  MG PER 24 HR TABLET    Take 1 tablet by mouth as needed.    MEDICAL SUPPLY, MISCELLANEOUS (O-2 SUSPENSORY MISC)    2 L by Misc.(Non-Drug; Combo Route) route every evening.    MELOXICAM (MOBIC) 7.5 MG TABLET    Take 7.5 mg by mouth once daily.    METOPROLOL SUCCINATE (TOPROL-XL) 50 MG 24 HR TABLET    Take 75 mg by mouth once daily.    MOMETASONE (NASONEX) 50 MCG/ACTUATION NASAL SPRAY    1 spray as needed.    OMEPRAZOLE-SODIUM BICARBONATE (ZEGERID) 40-1.1 MG-GRAM PER CAPSULE    Take 1 capsule by mouth once as needed.    ONDANSETRON (ZOFRAN-ODT) 8 MG TBDL    Take 8 mg by mouth every 6 (six) hours as needed.    OXYBUTYNIN (DITROPAN-XL) 10 MG 24 HR TABLET    Take 10 mg by mouth.    PILOCARPINE (SALAGEN) 5 MG TAB    Take 5 mg by mouth 3 (three) times daily.    PROPRANOLOL (INDERAL LA) 160 MG 24 HR CAPSULE    160 mg.    SUCRALFATE (CARAFATE) 1 GRAM TABLET    Take 1 g by mouth 4 (four) times daily.    TOLTERODINE (DETROL LA) 4 MG 24 HR CAPSULE    tolterodine ER 4 mg capsule,extended release 24 hr    TRIAMCINOLONE (NASACORT) 55 MCG NASAL INHALER    2 sprays by Nasal route as needed.    VONOPRAZAN (VOQUEZNA) 10 MG TAB    Take 10 mg by mouth once daily.   Changed and/or Refilled Medications    Modified Medication Previous Medication    PREGABALIN (LYRICA) 100 MG CAPSULE pregabalin (LYRICA) 100 MG capsule       Take 1 capsule (100 mg total) by mouth every morning.    TAKE 1 CAPSULE BY MOUTH TWICE A DAY    PREGABALIN (LYRICA) 150 MG CAPSULE pregabalin (LYRICA) 150 MG capsule       Take 1 capsule (150  mg total) by mouth every evening.    TAKE 1 CAPSULE BY MOUTH EVERY DAY IN THE EVENING        Vitals:    01/14/25 0942   BP: 120/60   Pulse: 70        General: alert and oriented, no acute distress, no audible wheezes, pulse intact, no edema, trach    Cognition and Comprehension  Speech and language intact  Follows commands  Speech fluent  Attention intact  Memory for recent events intact from history taking  Affect pleasant  Fund of knowledge adequate    Cranial nerves  II. Optic: Visual fields full to confrontation both eyes  III, IV, VI. Oculomotor: Intact, Pupils equal, round and reactive to light, no nystagmus  V. Trigeminal: sensation to light touch normal  VII. No facial asymmetry or facial weakness  VIII. Hearing intact to spoken voice  IX/X. Glossopharyngeal/Vagus: Voice normal, palate rises symmetrically  XI. Axillary: Shoulder shrug normal  XII. Hypoglossal: Intact    Muscle Strength and Tone  Normal upper extremity tone  Normal lower extremity tone  Normal upper extremity strength  Normal lower extremity strength      Reflexes  hypo    Coordination and Gait  Finger to nose normal  Gait normal     1. Peripheral polyneuropathy  -     pregabalin (LYRICA) 150 MG capsule; Take 1 capsule (150 mg total) by mouth every evening.  Dispense: 90 capsule; Refill: 3  -     pregabalin (LYRICA) 100 MG capsule; Take 1 capsule (100 mg total) by mouth every morning.  Dispense: 90 capsule; Refill: 3       Refill Lyrica, x rays show scoliosis, would supect that there is some cervical spinal stenosis causing UE sx based on xray findings. She will see ortho soon, and will review any MRIs that are ordered

## 2025-01-27 PROBLEM — Z85.3 HISTORY OF BREAST CANCER: Status: ACTIVE | Noted: 2025-01-27

## 2025-01-27 PROBLEM — C50.412 MALIGNANT NEOPLASM OF UPPER-OUTER QUADRANT OF LEFT BREAST IN FEMALE, ESTROGEN RECEPTOR POSITIVE: Status: RESOLVED | Noted: 2023-01-22 | Resolved: 2025-01-27

## 2025-01-27 PROBLEM — Z85.850 HISTORY OF THYROID CANCER: Status: ACTIVE | Noted: 2025-01-27

## 2025-01-27 PROBLEM — Z17.0 MALIGNANT NEOPLASM OF UPPER-OUTER QUADRANT OF LEFT BREAST IN FEMALE, ESTROGEN RECEPTOR POSITIVE: Status: RESOLVED | Noted: 2023-01-22 | Resolved: 2025-01-27

## 2025-01-28 NOTE — PROGRESS NOTES
Subjective:       Patient ID: Raymundo Arechiga is a 89 y.o. female.  Radiation oncologist: Dr. Chris Caldera  Primary Care: Dr. Svetlana Gramajo  Surgeon: Dr. Jan Roca  Endocrinologist: Dr. Kalen Garnett  Pulmonologist: Dr. Chandler Regan     1. Left breast cancer stage IA ([id number])--diagnosed 10/31/2011       Biopsy/histology: US guided biopsy left breast lesion done 10/31/2011--IDCA, grade 2, ER 86%, GA 82%, Her2 1+IHC.       Surgery/histology: Left  lumpectomy 11/21/2011, IDCA, low grade, 0.7 cm, 0 of 3 nodes, margins negative.                              DEXA:  9/30/14 - Osteopenia L2-4 T score of -1.22, AP spine -1.21, Left hip -1.11, L fem neck -2.12.   5/30/17 - Osteopenia L2-L4 T score of -1.32, Left hip -1.73, Left fem neck -2.41.     2. Recurrence Left Paratracheal 10/27/20  Recurrence Left Neck Nodules 6/2018  Thyroid Cancer diagnosed 7/14/15        Surgery/histology:   1. 7/2015--Right thyroid and isthmus--papillary thyroid carcinoma, well differentiated, classical type, 7.8cm, invades thyroid gland capsule, indeterminate lymphovascular invasion; Left thyroid lobectomy-nodular hyperplasia.  2. 11/8/16--[facility] S/p Total laryngectomy with tracheal resection of tracheal rings 1-4, bilateral revision level VI dissection, autotransplantation of bilateral inferior parathyroid glands in bilateral SCM and right pectoralis muscle flap. Pathology positive for papillary thyroid carcinoma, dominant follicular variant, involving laryngeal cartilage (2.5 cm in greatest dimension). Carcinoma was present in right first tracheal interspace, skeletal muscle, first 3 tracheal rings. 0:9 lymph nodes negative for tumor.   3. s/p Secondary TEP surgery done by Dr. Patrick Hart 5/26/17.  4. Left neck dissection and removal of nodules done 6/5/18 at [facility]--multifocal papillary thyroid carcinoma involving fibrous tissue and focal skeletal muscle.  5. Revision left and midline level VI and level VII neck dissection, multifocal left  paratracheal soft tissue disease grossly excised, midline superior mediastinal nodes removed done 10/27/20 at [facility]--pathology showed papillary thyroid carcinoma in fibrous tissue and muscle 2cm, may represent bed recurrence vs. gross replacement and extranodal extension of lymph node, superior mediastinal lymph nodes 6 negative for tumor.  6. Revision left central and right Level IV dissection on 21--Papillary thyroid carcinoma in fibrous tissue x 5 deposits measuring 0.6 cm, 0.6 cm, 0.7 cm, 0.8 cm and 0.8 cm. BRAF-mutated.     3. Meningiomas by MRI 2018     4. Bilateral Femoral DVT's 18--treated with 3 months Eliquis     Imagin. CT chest done at [facility] 11/2/15--LLL superior segment nodule/nodularity appears slightly more prominent than before measures 12X7mm, recommend close observation, stable precarinal lymph node, upper limits of normal.  2. CT chest 16--pulmonary nodule w/n LLL has diminished in volume.  3. CT chest 3/2/17--unchanged DEBORAH 4mm nodule.  4. PET/CT 18 [facility]--2 FDG-avid nodule left central compartment at total thyroidectomy bed, suggestive of metastatic disease, 2 additional small foci of mildly FDG-avid uptake in left neck, not specific but metastatic disease cannot be exluded, 2cm rounded lesion right frontal cortex without uptake, could represent metastasis vs. meningioma.  5. MRI brain w/ and w/o contrast [facility] 18--extra-axial mass overlying high right posterior frontal convexity c/w meningioma, thickened dural-based lesion seen overlying right petrous bone also c/w meningioma.  6. Venous doppler bilateral LE [facility] 18--occlusive DVT distal left femoral vein, near occlusive DVT distal right femoral vein.  7. MRI brain w/ and w/o contrast done 3/20/19--a meningioma in the region of the right central sulcus has slightly increased since 18, stable small right posterior fossa meningioma.  8. CT soft tissue neck w/ contrast 3/6/19--no definite local or gerardo  recurrence.  9. US soft tissue neck 3/6/19--no definite local or gerardo recurrence, a left retroclavicular lymph node can be followed sonographically.  10. MRI brain at [facility] on 3/10/20--Stable right posterior fossa cerebellar pontine angle 1.1 cm meningioma. Stable right frontal parietal convexity 2cm meningioma.   11. CT soft tissue neck at [facility] on 3/10/20--Focal areas of enhancement along the left margin of the trachea which have demonstrated slow growth since 3/6/19 and are concerning for recurrent/residual cancer. Postoperative changes of total thyroidectomy, antrectomy, neck reconstruction and neck dissection.   12. CT chest w/o contrast at [facility] 9/9/20--enlarging nodule to left of tracheostomy concerning for metastatic disease, new prevascular adenopathy, clustered micronodules and focal ground glass in left lung most likely inflammatory, CAD.  13. CT neck w/ contrast on 9/9/20--complex surgical changes with resection of larynx pharynx, a flap reconstruction on permanent tracheostomy.  14. PET/CT at [facility] on 5/12/21--FDG avid left supraclavicular/peristomal nodules are suspicious for recurrence. Focal uptake in the left tracheoesophageal groove without clear CT correlate, is possibly recurrent disease. Focal uptake in the right neck is probably physiologic muscular versus vascular activity, attention on follow-up studies. Scattered pulmonary nodules are below the resolution of PET, continued attention on follow-up as clinically indicated. Otherwise, no evidence for distant metastatic disease.   15. US neck at [facility] on 5/13/21--Limited examination shows no lateral neck adenopathy.  16. CT chest on 5/13/21--Small nonspecific nodules are stable. No new pulmonary nodules or lymphadenopathy.   17. CT neck on 5/13/21--2 small nodules measuring 0.5 cm and 0.7 cm adjacent to the left side of the stoma which likely correspond to the site of metabolic activity on PET/CT. The hypermetabolic focus in the right supraclavicular  region does not correspond to definite nodularity, possible postoperative change associated with vascular structures. No lateral neck adenopathy. Stable 1.1 cm presumed meningioma in the right posterior fossa since April 2015.   18.CT neck w/ contrast on 12/1/21--Interval surgery and no evidence of recurrence in central compartment. No lateral neck adenopathy.   19. CT chest on 12/1/21--Nonspecific tiny pulmonary nodules are stable.   20. US head and neck on 12/1/21--No local recurrence. No lymphadenopathy.   21. PET/CT on 12/2/21 at [facility]--No evidence of FDG avid recurrent or metastatic thyroid carcinoma.   22. CT soft tissue neck/Chest done 11/30/22--no definite local recurrence in central compartments of neck, nonspecific lymph nodes posterior to the right inferior carotid sheath, stable, small nonspecific lung nodules are stable, no new nodules or adenopathy.   23. CT soft tissue neck/chest at [facility] on 9/20/23--Stable subtle enhancing 5 mm focus along the anterior left SCM at site of prior focal FDG avidity. The stability of this finding is reassuring, but close attention on follow-up will be required to exclude site of residual neoplastic disease. Otherwise, no sites concerning for progressive neoplastic disease within the head and neck, including no evidence of suspicious head and neck lymphadenopathy. Resolution of opacities identified as new on the prior study. There are new small groundglass nodules in the right upper lobe consistent with inflammatory infectious process.   24. PET/CT 3/7/24 at [facility]--No evidence of FDG-avid recurrent or metastatic thyroid cancer.  A stable small focus of mild activity localizing to the left sternocleidomastoid muscle probably relates to an implanted parathyroid gland.      Treatment history:  1.Adjuvant left breast radiation completed 2013  2. Aromasin 12/13/2011--1/16/17 (completed 5 years treatment)  3. Radioactive iodine post-operatively - last dose on 2/23/16.   4. No  post-operative radiation recommended due to high risk for tracheoinnominate fistula.  5. Eliquis 6/5/18--9/5/18 for bilateral LE DVT.     Current treatment: Observation.       Chief Complaint: Other Misc (Pt reports no concerns today.)    HPI  Patient presents for follow-up of breast cancer the thyroid cancer. She continues to be followed by [facility] and scans were stable in 3/2024. She will return for repeat labs and imaging in a few weeks. Recent screening MMG on 1/15/24 at [facility] benign. She reports feeling tired after standing long periods of time and also reports that she has developed osteoporosis and may need treatment. No other new complaints. She is overall feeling well. She stays active. She now has 16 grandchildren and 16 great grandchildren. She had lost weight back earlier this year, but now her weight has picked up.    Past Medical History:   Diagnosis Date    Breast cancer     DVT (deep venous thrombosis)     Hyperlipidemia     Malignant neoplasm of upper-outer quadrant of left breast in female, estrogen receptor positive 01/22/2023    Mitral valve prolapse     Osteoarthritis of knee     Peripheral neuropathy       Review of patient's allergies indicates:  No Known Allergies   Current Outpatient Medications on File Prior to Visit   Medication Sig Dispense Refill    ALPRAZolam (XANAX) 0.25 MG tablet Take 0.125-0.25 mg by mouth 2 (two) times daily as needed.      calcium carbonate (OS-INDU) 600 mg calcium (1,500 mg) Tab Take 600 mg by mouth once daily.      dexlansoprazole (DEXILANT) 60 mg capsule Take 60 mg by mouth Daily.      famotidine (PEPCID) 40 mg/5 mL (8 mg/mL) suspension Take 20 mg by mouth once as needed.      fesoterodine (TOVIAZ) 4 mg Tb24 Toviaz 4 mg tablet,extended release      levothyroxine (SYNTHROID) 25 MCG tablet Take 25 mcg by mouth before breakfast.      loratadine-pseudoephedrine  mg (CLARITIN-D 24-HOUR)  mg per 24 hr tablet Take 1 tablet by mouth as needed.       meloxicam (MOBIC) 7.5 MG tablet Take 7.5 mg by mouth once daily.      metoprolol succinate (TOPROL-XL) 50 MG 24 hr tablet Take 75 mg by mouth once daily.      mometasone (NASONEX) 50 mcg/actuation nasal spray 1 spray as needed.      omeprazole-sodium bicarbonate (ZEGERID) 40-1.1 mg-gram per capsule Take 1 capsule by mouth once as needed.      ondansetron (ZOFRAN-ODT) 8 MG TbDL Take 8 mg by mouth every 6 (six) hours as needed.      oxybutynin (DITROPAN-XL) 10 MG 24 hr tablet Take 10 mg by mouth.      pilocarpine (SALAGEN) 5 MG Tab Take 5 mg by mouth 3 (three) times daily.      pregabalin (LYRICA) 100 MG capsule Take 1 capsule (100 mg total) by mouth every morning. 90 capsule 3    pregabalin (LYRICA) 150 MG capsule Take 1 capsule (150 mg total) by mouth every evening. 90 capsule 3    propranoloL (INDERAL LA) 160 mg 24 hr capsule 160 mg.      tolterodine (DETROL LA) 4 MG 24 hr capsule       triamcinolone (NASACORT) 55 mcg nasal inhaler 2 sprays by Nasal route as needed.      vonoprazan (VOQUEZNA) 10 mg Tab Take 10 mg by mouth once daily.      albuterol (PROVENTIL) 2.5 mg /3 mL (0.083 %) nebulizer solution Inhale 2.5 mg into the lungs every 6 (six) hours as needed. (Patient not taking: Reported on 2/3/2025)      aspirin (ECOTRIN) 81 MG EC tablet Take 81 mg by mouth once daily.      fexofenadine-pseudoephedrine  mg (ALLEGRA-D)  mg per tablet Take 1 tablet by mouth as needed. (Patient not taking: Reported on 2/3/2025)      loperamide (IMODIUM) 2 mg capsule Take 2 mg by mouth once as needed. (Patient not taking: Reported on 2/3/2025)      medical supply, miscellaneous (O-2 SUSPENSORY MISC) 2 L by Misc.(Non-Drug; Combo Route) route every evening. (Patient not taking: Reported on 2/3/2025)      sucralfate (CARAFATE) 1 gram tablet Take 1 g by mouth 4 (four) times daily. (Patient not taking: Reported on 2/3/2025)       No current facility-administered medications on file prior to visit.      Review of Systems    Constitutional:  Negative for appetite change, fatigue, fever and unexpected weight change.   HENT:  Negative for mouth sores.    Eyes: Negative.    Respiratory:  Negative for cough and shortness of breath.    Cardiovascular:  Negative for chest pain and leg swelling.   Gastrointestinal:  Positive for reflux. Negative for abdominal distention, abdominal pain, constipation, diarrhea, nausea and vomiting.   Genitourinary:  Negative for difficulty urinating, dysuria and hematuria.   Musculoskeletal:  Negative for arthralgias and back pain.   Integumentary:  Negative for rash.   Neurological:  Negative for weakness and headaches.   Hematological:  Negative for adenopathy.   Psychiatric/Behavioral:  Negative for sleep disturbance. The patient is not nervous/anxious.      Vitals:    02/03/25 0944   BP: (!) 112/54   Pulse: 65   Resp: 14   Temp: 98.1 °F (36.7 °C)         Wt Readings from Last 3 Encounters:   02/03/25 0944 55.3 kg (121 lb 14.4 oz)   01/14/25 0942 55.5 kg (122 lb 6.4 oz)   07/10/24 0938 52.2 kg (115 lb)        Physical Exam  Constitutional:       Appearance: Normal appearance.      Comments: Thin elderly white female   HENT:      Head: Normocephalic.      Nose: Nose normal.      Mouth/Throat:      Mouth: Mucous membranes are moist.   Eyes:      Extraocular Movements: Extraocular movements intact.      Conjunctiva/sclera: Conjunctivae normal.   Neck:      Comments: Tracheostomy in place with speaking valve  Cardiovascular:      Rate and Rhythm: Normal rate and regular rhythm.   Pulmonary:      Effort: Pulmonary effort is normal.      Breath sounds: Normal breath sounds.      Comments: Faint crackles in bases  Chest:      Comments: Scar right chest from flap surgery for thyroid, left breast with lumpectomy scar well-healed, no masses in either breast and no axillary adenopathy.   Abdominal:      General: Abdomen is flat. Bowel sounds are normal. There is no distension.      Palpations: Abdomen is soft.       Tenderness: There is no abdominal tenderness.   Musculoskeletal:         General: Normal range of motion.   Skin:     General: Skin is warm.      Comments: Small subQ lesion left anterior shoulder, feels like small lipoma or cyst, soft and moveable, small brown skin lesion on neck, likely seborrhea.    Neurological:      General: No focal deficit present.      Mental Status: She is alert and oriented to person, place, and time.   Psychiatric:         Mood and Affect: Mood normal.         Judgment: Judgment normal.       Lab Visit on 01/31/2025   Component Date Value    Sodium 01/31/2025 140     Potassium 01/31/2025 4.3     Chloride 01/31/2025 105     CO2 01/31/2025 29     Glucose 01/31/2025 118 (H)     Blood Urea Nitrogen 01/31/2025 20.0     Creatinine 01/31/2025 0.90     Calcium 01/31/2025 9.2     Protein Total 01/31/2025 7.0     Albumin 01/31/2025 3.6     Globulin 01/31/2025 3.4     Albumin/Globulin Ratio 01/31/2025 1.1     Bilirubin Total 01/31/2025 0.5     ALP 01/31/2025 63     ALT 01/31/2025 15     AST 01/31/2025 20     eGFR 01/31/2025 >60     Anion Gap 01/31/2025 6.0     BUN/Creatinine Ratio 01/31/2025 22     WBC 01/31/2025 4.20 (L)     RBC 01/31/2025 4.38     Hgb 01/31/2025 12.5     Hct 01/31/2025 38.6     MCV 01/31/2025 88.1     MCH 01/31/2025 28.5     MCHC 01/31/2025 32.4 (L)     RDW 01/31/2025 14.2     Platelet 01/31/2025 198     MPV 01/31/2025 9.0     Neut % 01/31/2025 57.4     Lymph % 01/31/2025 29.3     Mono % 01/31/2025 10.2     Eos % 01/31/2025 1.9     Basophil % 01/31/2025 1.0     Imm Grans % 01/31/2025 0.2     Neut # 01/31/2025 2.41     Lymph # 01/31/2025 1.23     Mono # 01/31/2025 0.43     Eos # 01/31/2025 0.08     Baso # 01/31/2025 0.04     Imm Gran # 01/31/2025 0.01           Assessment:       1. History of breast cancer    2. History of thyroid cancer         Plan:       Patient with h/o left early stage breast cancer diagnosed in 2011, treated with lumpectomy and adjuvant radiation. Completed  adjuvant Aromasin since 1/2017 (5 years).     Currently patient has no signs or symptoms to suggest recurrence of her breast cancer by labs or physical exam findings.  MMG done at [facility] 12/21/22 benign. Repeat recommended in 1 year--ordered today.  Recent labs all good.    Had recurrence of thyroid cancer s/p left neck dissection 6/5/18 and continues follow-up with [facility].  S/p bilateral femoral DVT post-surgical diagnosed 6/5/18 at [facility] and completed 3 months of treatment with Eliquis.  Paratracheal gerardo recurrence noted on scans from 3/2020 and 9/2020.   Patient s/p resection 10/27/20 done Dr. Steiner at [facility]. Nodule with recurrence of thyroid cancer but mediastinal nodes negative.   In 5/2021 she returned to [facility] and had local recurrence seen on imaging. Also had thyroglobulin level increasing. S/p revision right supraclavicular (level IV) and left peristomal (level VI) dissection by Dr. Steiner on 6/9/21 at [facility]. Pathology positive for papillary thyroid carcinoma.     PET at [facility] in 3/2024 stable and brain MRI stable for meningiomas.  She continues follow-up at [facility] for thyroid cancer, and meningiomas.   Scheduled for repeat scans in 3/2025 and visit at [facility].      Recent labs all good.   Screening MMG from 1/15/25 all good.     She is now >13 years out from her breast cancer, will continue to follow annually due to patient preference.     Will schedule screening MMG at [facility] prior to follow-up appointment.       She is also established here with ENT Dr. Patrick Hart and Dr. Garnett.  RTC 1 year for continued follow-up of breast cancer with labs.  I did recommend she see dermatology annually for her skin concerns. She is followed by Dr. Kassie Arora.     Patient was told to contact me with any problems before return to clinic.        Ritika Montanez MD    Visit today included increased complexity associated with the care of the episodic problem breast cancer, addressing and managing the longitudinal care of the patient's  breast cancer.

## 2025-01-31 ENCOUNTER — LAB VISIT (OUTPATIENT)
Dept: LAB | Facility: HOSPITAL | Age: 89
End: 2025-01-31
Attending: INTERNAL MEDICINE
Payer: MEDICARE

## 2025-01-31 DIAGNOSIS — C50.412 MALIGNANT NEOPLASM OF UPPER-OUTER QUADRANT OF LEFT BREAST IN FEMALE, ESTROGEN RECEPTOR POSITIVE: ICD-10-CM

## 2025-01-31 DIAGNOSIS — C73 THYROID CANCER: ICD-10-CM

## 2025-01-31 DIAGNOSIS — D32.0 INTRACRANIAL MENINGIOMA: ICD-10-CM

## 2025-01-31 DIAGNOSIS — Z17.0 MALIGNANT NEOPLASM OF UPPER-OUTER QUADRANT OF LEFT BREAST IN FEMALE, ESTROGEN RECEPTOR POSITIVE: ICD-10-CM

## 2025-01-31 LAB
ALBUMIN SERPL-MCNC: 3.6 G/DL (ref 3.4–4.8)
ALBUMIN/GLOB SERPL: 1.1 RATIO (ref 1.1–2)
ALP SERPL-CCNC: 63 UNIT/L (ref 40–150)
ALT SERPL-CCNC: 15 UNIT/L (ref 0–55)
ANION GAP SERPL CALC-SCNC: 6 MEQ/L
AST SERPL-CCNC: 20 UNIT/L (ref 5–34)
BASOPHILS # BLD AUTO: 0.04 X10(3)/MCL
BASOPHILS NFR BLD AUTO: 1 %
BILIRUB SERPL-MCNC: 0.5 MG/DL
BUN SERPL-MCNC: 20 MG/DL (ref 9.8–20.1)
CALCIUM SERPL-MCNC: 9.2 MG/DL (ref 8.4–10.2)
CHLORIDE SERPL-SCNC: 105 MMOL/L (ref 98–107)
CO2 SERPL-SCNC: 29 MMOL/L (ref 23–31)
CREAT SERPL-MCNC: 0.9 MG/DL (ref 0.55–1.02)
CREAT/UREA NIT SERPL: 22
EOSINOPHIL # BLD AUTO: 0.08 X10(3)/MCL (ref 0–0.9)
EOSINOPHIL NFR BLD AUTO: 1.9 %
ERYTHROCYTE [DISTWIDTH] IN BLOOD BY AUTOMATED COUNT: 14.2 % (ref 11.5–17)
GFR SERPLBLD CREATININE-BSD FMLA CKD-EPI: >60 ML/MIN/1.73/M2
GLOBULIN SER-MCNC: 3.4 GM/DL (ref 2.4–3.5)
GLUCOSE SERPL-MCNC: 118 MG/DL (ref 82–115)
HCT VFR BLD AUTO: 38.6 % (ref 37–47)
HGB BLD-MCNC: 12.5 G/DL (ref 12–16)
IMM GRANULOCYTES # BLD AUTO: 0.01 X10(3)/MCL (ref 0–0.04)
IMM GRANULOCYTES NFR BLD AUTO: 0.2 %
LYMPHOCYTES # BLD AUTO: 1.23 X10(3)/MCL (ref 0.6–4.6)
LYMPHOCYTES NFR BLD AUTO: 29.3 %
MCH RBC QN AUTO: 28.5 PG (ref 27–31)
MCHC RBC AUTO-ENTMCNC: 32.4 G/DL (ref 33–36)
MCV RBC AUTO: 88.1 FL (ref 80–94)
MONOCYTES # BLD AUTO: 0.43 X10(3)/MCL (ref 0.1–1.3)
MONOCYTES NFR BLD AUTO: 10.2 %
NEUTROPHILS # BLD AUTO: 2.41 X10(3)/MCL (ref 2.1–9.2)
NEUTROPHILS NFR BLD AUTO: 57.4 %
PLATELET # BLD AUTO: 198 X10(3)/MCL (ref 130–400)
PMV BLD AUTO: 9 FL (ref 7.4–10.4)
POTASSIUM SERPL-SCNC: 4.3 MMOL/L (ref 3.5–5.1)
PROT SERPL-MCNC: 7 GM/DL (ref 5.8–7.6)
RBC # BLD AUTO: 4.38 X10(6)/MCL (ref 4.2–5.4)
SODIUM SERPL-SCNC: 140 MMOL/L (ref 136–145)
WBC # BLD AUTO: 4.2 X10(3)/MCL (ref 4.5–11.5)

## 2025-01-31 PROCEDURE — 36415 COLL VENOUS BLD VENIPUNCTURE: CPT

## 2025-01-31 PROCEDURE — 80053 COMPREHEN METABOLIC PANEL: CPT

## 2025-01-31 PROCEDURE — 85025 COMPLETE CBC W/AUTO DIFF WBC: CPT

## 2025-02-03 ENCOUNTER — OFFICE VISIT (OUTPATIENT)
Dept: HEMATOLOGY/ONCOLOGY | Facility: CLINIC | Age: 89
End: 2025-02-03
Payer: MEDICARE

## 2025-02-03 VITALS
OXYGEN SATURATION: 98 % | HEART RATE: 65 BPM | RESPIRATION RATE: 14 BRPM | BODY MASS INDEX: 18.05 KG/M2 | TEMPERATURE: 98 F | WEIGHT: 121.88 LBS | SYSTOLIC BLOOD PRESSURE: 112 MMHG | DIASTOLIC BLOOD PRESSURE: 54 MMHG | HEIGHT: 69 IN

## 2025-02-03 DIAGNOSIS — Z85.850 HISTORY OF THYROID CANCER: ICD-10-CM

## 2025-02-03 DIAGNOSIS — C78.39: ICD-10-CM

## 2025-02-03 DIAGNOSIS — Z85.3 HISTORY OF BREAST CANCER: Primary | ICD-10-CM

## 2025-02-03 DIAGNOSIS — Z12.31 BREAST CANCER SCREENING BY MAMMOGRAM: ICD-10-CM

## 2025-02-03 PROCEDURE — 99999 PR PBB SHADOW E&M-EST. PATIENT-LVL V: CPT | Mod: PBBFAC,,, | Performed by: INTERNAL MEDICINE

## 2025-02-03 PROCEDURE — G2211 COMPLEX E/M VISIT ADD ON: HCPCS | Mod: S$PBB,,, | Performed by: INTERNAL MEDICINE

## 2025-02-03 PROCEDURE — 99215 OFFICE O/P EST HI 40 MIN: CPT | Mod: PBBFAC | Performed by: INTERNAL MEDICINE

## 2025-02-03 PROCEDURE — 99214 OFFICE O/P EST MOD 30 MIN: CPT | Mod: S$PBB,,, | Performed by: INTERNAL MEDICINE

## 2025-04-15 DIAGNOSIS — R49.1 APHONIA: Primary | ICD-10-CM

## 2025-08-02 DIAGNOSIS — G62.9 PERIPHERAL POLYNEUROPATHY: ICD-10-CM

## 2025-08-04 DIAGNOSIS — G62.9 PERIPHERAL POLYNEUROPATHY: Primary | ICD-10-CM

## 2025-08-04 RX ORDER — PREGABALIN 100 MG/1
100 CAPSULE ORAL EVERY MORNING
Qty: 90 CAPSULE | OUTPATIENT
Start: 2025-08-04

## 2025-08-04 RX ORDER — PREGABALIN 150 MG/1
150 CAPSULE ORAL 2 TIMES DAILY
Qty: 60 CAPSULE | Refills: 5 | Status: SHIPPED | OUTPATIENT
Start: 2025-08-04

## 2025-08-04 NOTE — TELEPHONE ENCOUNTER
Requesting a refill on Lyrica 150 mg. Last office visit was 1/14/2025 and last fill on medication was 1/14/2025 also. Please advise.

## 2025-08-05 RX ORDER — PREGABALIN 100 MG/1
100 CAPSULE ORAL DAILY
Qty: 30 CAPSULE | Refills: 6 | Status: SHIPPED | OUTPATIENT
Start: 2025-08-05 | End: 2026-02-03

## 2025-08-22 DIAGNOSIS — G62.9 PERIPHERAL POLYNEUROPATHY: ICD-10-CM

## 2025-08-25 RX ORDER — PREGABALIN 150 MG/1
150 CAPSULE ORAL NIGHTLY
Qty: 30 CAPSULE | Refills: 3 | Status: SHIPPED | OUTPATIENT
Start: 2025-08-25